# Patient Record
Sex: MALE | Race: WHITE | NOT HISPANIC OR LATINO | Employment: OTHER | URBAN - METROPOLITAN AREA
[De-identification: names, ages, dates, MRNs, and addresses within clinical notes are randomized per-mention and may not be internally consistent; named-entity substitution may affect disease eponyms.]

---

## 2019-08-29 ENCOUNTER — HOSPITAL ENCOUNTER (OUTPATIENT)
Facility: HOSPITAL | Age: 79
Discharge: HOME | End: 2019-08-30
Attending: FAMILY MEDICINE | Admitting: INTERNAL MEDICINE
Payer: MEDICARE

## 2019-08-29 DIAGNOSIS — R55 SYNCOPE: ICD-10-CM

## 2019-08-29 DIAGNOSIS — R00.1 BRADYCARDIA: ICD-10-CM

## 2019-08-29 DIAGNOSIS — R40.20 LOC (LOSS OF CONSCIOUSNESS): Primary | ICD-10-CM

## 2019-08-29 LAB
ALBUMIN SERPL BCP-MCNC: 3.7 G/DL (ref 3.5–5.2)
ALP SERPL-CCNC: 45 U/L (ref 55–135)
ALT SERPL W/O P-5'-P-CCNC: 16 U/L (ref 10–44)
AMPHET+METHAMPHET UR QL: NEGATIVE
ANION GAP SERPL CALC-SCNC: 14 MMOL/L (ref 8–16)
APAP SERPL-MCNC: <3 UG/ML (ref 10–20)
APTT BLDCRRT: 22.5 SEC (ref 21–32)
AST SERPL-CCNC: 14 U/L (ref 10–40)
BACTERIA #/AREA URNS HPF: ABNORMAL /HPF
BARBITURATES UR QL SCN>200 NG/ML: NEGATIVE
BASOPHILS # BLD AUTO: 0.02 K/UL (ref 0–0.2)
BASOPHILS NFR BLD: 0.2 % (ref 0–1.9)
BENZODIAZ UR QL SCN>200 NG/ML: NEGATIVE
BILIRUB SERPL-MCNC: 0.7 MG/DL (ref 0.1–1)
BILIRUB UR QL STRIP: NEGATIVE
BNP SERPL-MCNC: 14 PG/ML (ref 0–99)
BUN SERPL-MCNC: 33 MG/DL (ref 8–23)
BZE UR QL SCN: NEGATIVE
CALCIUM SERPL-MCNC: 8.8 MG/DL (ref 8.7–10.5)
CANNABINOIDS UR QL SCN: NEGATIVE
CHLORIDE SERPL-SCNC: 96 MMOL/L (ref 95–110)
CK SERPL-CCNC: 223 U/L (ref 20–200)
CLARITY UR: CLEAR
CO2 SERPL-SCNC: 32 MMOL/L (ref 23–29)
COLOR UR: YELLOW
CREAT SERPL-MCNC: 1.1 MG/DL (ref 0.5–1.4)
CREAT UR-MCNC: 104.3 MG/DL (ref 23–375)
DIFFERENTIAL METHOD: ABNORMAL
EOSINOPHIL # BLD AUTO: 0.1 K/UL (ref 0–0.5)
EOSINOPHIL NFR BLD: 0.6 % (ref 0–8)
ERYTHROCYTE [DISTWIDTH] IN BLOOD BY AUTOMATED COUNT: 13.3 % (ref 11.5–14.5)
EST. GFR  (AFRICAN AMERICAN): >60 ML/MIN/1.73 M^2
EST. GFR  (NON AFRICAN AMERICAN): >60 ML/MIN/1.73 M^2
GLUCOSE SERPL-MCNC: 142 MG/DL (ref 70–110)
GLUCOSE UR QL STRIP: NEGATIVE
HCT VFR BLD AUTO: 35.6 % (ref 40–54)
HGB BLD-MCNC: 11.8 G/DL (ref 14–18)
HGB UR QL STRIP: NEGATIVE
INR PPP: 1 (ref 0.8–1.2)
KETONES UR QL STRIP: NEGATIVE
LEUKOCYTE ESTERASE UR QL STRIP: ABNORMAL
LYMPHOCYTES # BLD AUTO: 1 K/UL (ref 1–4.8)
LYMPHOCYTES NFR BLD: 9.8 % (ref 18–48)
MAGNESIUM SERPL-MCNC: 1.7 MG/DL (ref 1.6–2.6)
MCH RBC QN AUTO: 30.6 PG (ref 27–31)
MCHC RBC AUTO-ENTMCNC: 33.1 G/DL (ref 32–36)
MCV RBC AUTO: 93 FL (ref 82–98)
METHADONE UR QL SCN>300 NG/ML: NEGATIVE
MICROSCOPIC COMMENT: ABNORMAL
MONOCYTES # BLD AUTO: 0.6 K/UL (ref 0.3–1)
MONOCYTES NFR BLD: 5.5 % (ref 4–15)
NEUTROPHILS # BLD AUTO: 8.5 K/UL (ref 1.8–7.7)
NEUTROPHILS NFR BLD: 84 % (ref 38–73)
NITRITE UR QL STRIP: NEGATIVE
OPIATES UR QL SCN: NEGATIVE
PCP UR QL SCN>25 NG/ML: NEGATIVE
PH UR STRIP: 7 [PH] (ref 5–8)
PLATELET # BLD AUTO: 231 K/UL (ref 150–350)
PMV BLD AUTO: 10.4 FL (ref 9.2–12.9)
POTASSIUM SERPL-SCNC: 2.6 MMOL/L (ref 3.5–5.1)
PROT SERPL-MCNC: 6.1 G/DL (ref 6–8.4)
PROT UR QL STRIP: NEGATIVE
PROTHROMBIN TIME: 11.1 SEC (ref 9–12.5)
RBC # BLD AUTO: 3.85 M/UL (ref 4.6–6.2)
SALICYLATES SERPL-MCNC: <5 MG/DL (ref 15–30)
SODIUM SERPL-SCNC: 142 MMOL/L (ref 136–145)
SP GR UR STRIP: 1.01 (ref 1–1.03)
TOXICOLOGY INFORMATION: NORMAL
TROPONIN I SERPL DL<=0.01 NG/ML-MCNC: 0.03 NG/ML (ref 0–0.03)
TSH SERPL DL<=0.005 MIU/L-ACNC: 3.01 UIU/ML (ref 0.4–4)
URN SPEC COLLECT METH UR: ABNORMAL
UROBILINOGEN UR STRIP-ACNC: NEGATIVE EU/DL
WBC # BLD AUTO: 10.16 K/UL (ref 3.9–12.7)
WBC #/AREA URNS HPF: 25 /HPF (ref 0–5)

## 2019-08-29 PROCEDURE — 85025 COMPLETE CBC W/AUTO DIFF WBC: CPT

## 2019-08-29 PROCEDURE — 87077 CULTURE AEROBIC IDENTIFY: CPT

## 2019-08-29 PROCEDURE — 93005 ELECTROCARDIOGRAM TRACING: CPT

## 2019-08-29 PROCEDURE — 96365 THER/PROPH/DIAG IV INF INIT: CPT

## 2019-08-29 PROCEDURE — 84484 ASSAY OF TROPONIN QUANT: CPT

## 2019-08-29 PROCEDURE — 81000 URINALYSIS NONAUTO W/SCOPE: CPT | Mod: 59

## 2019-08-29 PROCEDURE — 83880 ASSAY OF NATRIURETIC PEPTIDE: CPT

## 2019-08-29 PROCEDURE — 80053 COMPREHEN METABOLIC PANEL: CPT

## 2019-08-29 PROCEDURE — 87086 URINE CULTURE/COLONY COUNT: CPT

## 2019-08-29 PROCEDURE — 87088 URINE BACTERIA CULTURE: CPT

## 2019-08-29 PROCEDURE — 63600175 PHARM REV CODE 636 W HCPCS: Performed by: NURSE PRACTITIONER

## 2019-08-29 PROCEDURE — 80307 DRUG TEST PRSMV CHEM ANLYZR: CPT

## 2019-08-29 PROCEDURE — 25000003 PHARM REV CODE 250: Performed by: FAMILY MEDICINE

## 2019-08-29 PROCEDURE — 99291 CRITICAL CARE FIRST HOUR: CPT | Mod: 25

## 2019-08-29 PROCEDURE — 84443 ASSAY THYROID STIM HORMONE: CPT

## 2019-08-29 PROCEDURE — 93010 ELECTROCARDIOGRAM REPORT: CPT | Mod: ,,, | Performed by: INTERNAL MEDICINE

## 2019-08-29 PROCEDURE — 82550 ASSAY OF CK (CPK): CPT

## 2019-08-29 PROCEDURE — 93010 EKG 12-LEAD: ICD-10-PCS | Mod: ,,, | Performed by: INTERNAL MEDICINE

## 2019-08-29 PROCEDURE — 87186 SC STD MICRODIL/AGAR DIL: CPT

## 2019-08-29 PROCEDURE — 85730 THROMBOPLASTIN TIME PARTIAL: CPT

## 2019-08-29 PROCEDURE — G0378 HOSPITAL OBSERVATION PER HR: HCPCS

## 2019-08-29 PROCEDURE — 85610 PROTHROMBIN TIME: CPT

## 2019-08-29 PROCEDURE — 83735 ASSAY OF MAGNESIUM: CPT

## 2019-08-29 PROCEDURE — 80329 ANALGESICS NON-OPIOID 1 OR 2: CPT

## 2019-08-29 RX ORDER — MAGNESIUM SULFATE HEPTAHYDRATE 40 MG/ML
2 INJECTION, SOLUTION INTRAVENOUS ONCE
Status: COMPLETED | OUTPATIENT
Start: 2019-08-30 | End: 2019-08-30

## 2019-08-29 RX ORDER — LEVOTHYROXINE SODIUM 25 UG/1
25 TABLET ORAL DAILY
Status: ON HOLD | COMMUNITY
End: 2019-08-30 | Stop reason: CLARIF

## 2019-08-29 RX ORDER — METOPROLOL SUCCINATE 25 MG/1
25 TABLET, EXTENDED RELEASE ORAL DAILY
Status: ON HOLD | COMMUNITY
End: 2019-08-30 | Stop reason: CLARIF

## 2019-08-29 RX ORDER — LISINOPRIL AND HYDROCHLOROTHIAZIDE 20; 25 MG/1; MG/1
1 TABLET ORAL 2 TIMES DAILY
COMMUNITY
End: 2020-01-16 | Stop reason: SDUPTHER

## 2019-08-29 RX ORDER — POTASSIUM CHLORIDE 20 MEQ/1
40 TABLET, EXTENDED RELEASE ORAL ONCE
Status: COMPLETED | OUTPATIENT
Start: 2019-08-30 | End: 2019-08-30

## 2019-08-29 RX ORDER — SIMVASTATIN 20 MG/1
20 TABLET, FILM COATED ORAL NIGHTLY
COMMUNITY
End: 2020-01-16 | Stop reason: SDUPTHER

## 2019-08-29 RX ORDER — POTASSIUM CHLORIDE 7.45 MG/ML
10 INJECTION INTRAVENOUS ONCE
Status: COMPLETED | OUTPATIENT
Start: 2019-08-29 | End: 2019-08-30

## 2019-08-29 RX ORDER — POTASSIUM CHLORIDE 20 MEQ/1
40 TABLET, EXTENDED RELEASE ORAL ONCE
Status: COMPLETED | OUTPATIENT
Start: 2019-08-29 | End: 2019-08-29

## 2019-08-29 RX ORDER — ASPIRIN 81 MG/1
81 TABLET ORAL DAILY
COMMUNITY

## 2019-08-29 RX ORDER — TORSEMIDE 10 MG/1
20 TABLET ORAL DAILY
Status: ON HOLD | COMMUNITY
End: 2019-08-30 | Stop reason: SDUPTHER

## 2019-08-29 RX ADMIN — POTASSIUM CHLORIDE 10 MEQ: 10 INJECTION, SOLUTION INTRAVENOUS at 11:08

## 2019-08-29 RX ADMIN — POTASSIUM CHLORIDE 40 MEQ: 1500 TABLET, EXTENDED RELEASE ORAL at 10:08

## 2019-08-30 VITALS
OXYGEN SATURATION: 97 % | WEIGHT: 198.44 LBS | DIASTOLIC BLOOD PRESSURE: 74 MMHG | BODY MASS INDEX: 26.88 KG/M2 | HEIGHT: 72 IN | TEMPERATURE: 97 F | HEART RATE: 59 BPM | SYSTOLIC BLOOD PRESSURE: 156 MMHG | RESPIRATION RATE: 18 BRPM

## 2019-08-30 PROBLEM — N39.0 UTI (URINARY TRACT INFECTION): Status: ACTIVE | Noted: 2019-08-30

## 2019-08-30 PROBLEM — E11.8 DIABETES MELLITUS WITH COMPLICATION: Status: ACTIVE | Noted: 2017-03-08

## 2019-08-30 PROBLEM — I95.9 HYPOTENSION: Status: RESOLVED | Noted: 2019-08-30 | Resolved: 2019-08-30

## 2019-08-30 PROBLEM — R79.89 ELEVATED TROPONIN: Status: RESOLVED | Noted: 2019-08-30 | Resolved: 2019-08-30

## 2019-08-30 PROBLEM — R79.89 ELEVATED TROPONIN: Status: ACTIVE | Noted: 2019-08-30

## 2019-08-30 PROBLEM — R55 SYNCOPE: Status: RESOLVED | Noted: 2019-08-29 | Resolved: 2019-08-30

## 2019-08-30 PROBLEM — I69.30 HISTORY OF CVA WITH RESIDUAL DEFICIT: Chronic | Status: ACTIVE | Noted: 2019-08-30

## 2019-08-30 PROBLEM — I95.9 HYPOTENSION: Status: ACTIVE | Noted: 2019-08-30

## 2019-08-30 PROBLEM — R00.1 SINUS BRADYCARDIA: Status: ACTIVE | Noted: 2019-08-30

## 2019-08-30 PROBLEM — E11.8 DIABETES MELLITUS WITH COMPLICATION: Chronic | Status: ACTIVE | Noted: 2017-03-08

## 2019-08-30 PROBLEM — R55 SYNCOPE: Status: ACTIVE | Noted: 2019-08-29

## 2019-08-30 PROBLEM — E87.6 HYPOKALEMIA: Status: ACTIVE | Noted: 2019-08-30

## 2019-08-30 PROBLEM — E11.9 TYPE 2 DIABETES MELLITUS, WITHOUT LONG-TERM CURRENT USE OF INSULIN: Chronic | Status: ACTIVE | Noted: 2017-03-08

## 2019-08-30 LAB
ANION GAP SERPL CALC-SCNC: 14 MMOL/L (ref 8–16)
BASOPHILS # BLD AUTO: 0.03 K/UL (ref 0–0.2)
BASOPHILS NFR BLD: 0.4 % (ref 0–1.9)
BUN SERPL-MCNC: 28 MG/DL (ref 8–23)
CALCIUM SERPL-MCNC: 8.7 MG/DL (ref 8.7–10.5)
CHLORIDE SERPL-SCNC: 97 MMOL/L (ref 95–110)
CHOLEST SERPL-MCNC: 141 MG/DL (ref 120–199)
CHOLEST/HDLC SERPL: 2.5 {RATIO} (ref 2–5)
CO2 SERPL-SCNC: 31 MMOL/L (ref 23–29)
CREAT SERPL-MCNC: 1.1 MG/DL (ref 0.5–1.4)
DIASTOLIC DYSFUNCTION: NO
DIFFERENTIAL METHOD: ABNORMAL
EOSINOPHIL # BLD AUTO: 0.1 K/UL (ref 0–0.5)
EOSINOPHIL NFR BLD: 0.7 % (ref 0–8)
ERYTHROCYTE [DISTWIDTH] IN BLOOD BY AUTOMATED COUNT: 13.2 % (ref 11.5–14.5)
EST. GFR  (AFRICAN AMERICAN): >60 ML/MIN/1.73 M^2
EST. GFR  (NON AFRICAN AMERICAN): >60 ML/MIN/1.73 M^2
ESTIMATED AVG GLUCOSE: 148 MG/DL (ref 68–131)
ESTIMATED PA SYSTOLIC PRESSURE: 34.42
GLUCOSE SERPL-MCNC: 173 MG/DL (ref 70–110)
HBA1C MFR BLD HPLC: 6.8 % (ref 4–5.6)
HCT VFR BLD AUTO: 35.2 % (ref 40–54)
HDLC SERPL-MCNC: 56 MG/DL (ref 40–75)
HDLC SERPL: 39.7 % (ref 20–50)
HGB BLD-MCNC: 11.9 G/DL (ref 14–18)
LACTATE SERPL-SCNC: 1.3 MMOL/L (ref 0.5–2.2)
LACTATE SERPL-SCNC: 3 MMOL/L (ref 0.5–2.2)
LDLC SERPL CALC-MCNC: 74.4 MG/DL (ref 63–159)
LYMPHOCYTES # BLD AUTO: 1.3 K/UL (ref 1–4.8)
LYMPHOCYTES NFR BLD: 18.6 % (ref 18–48)
MAGNESIUM SERPL-MCNC: 2 MG/DL (ref 1.6–2.6)
MCH RBC QN AUTO: 31.5 PG (ref 27–31)
MCHC RBC AUTO-ENTMCNC: 33.8 G/DL (ref 32–36)
MCV RBC AUTO: 93 FL (ref 82–98)
MONOCYTES # BLD AUTO: 0.4 K/UL (ref 0.3–1)
MONOCYTES NFR BLD: 6.4 % (ref 4–15)
NEUTROPHILS # BLD AUTO: 5 K/UL (ref 1.8–7.7)
NEUTROPHILS NFR BLD: 74 % (ref 38–73)
NONHDLC SERPL-MCNC: 85 MG/DL
PLATELET # BLD AUTO: 237 K/UL (ref 150–350)
PMV BLD AUTO: 10.4 FL (ref 9.2–12.9)
POCT GLUCOSE: 135 MG/DL (ref 70–110)
POTASSIUM SERPL-SCNC: 2.8 MMOL/L (ref 3.5–5.1)
POTASSIUM SERPL-SCNC: 3.2 MMOL/L (ref 3.5–5.1)
RBC # BLD AUTO: 3.78 M/UL (ref 4.6–6.2)
RETIRED EF AND QEF - SEE NOTES: 60 (ref 55–65)
SODIUM SERPL-SCNC: 142 MMOL/L (ref 136–145)
TRIGL SERPL-MCNC: 53 MG/DL (ref 30–150)
TROPONIN I SERPL DL<=0.01 NG/ML-MCNC: 0.02 NG/ML (ref 0–0.03)
TROPONIN I SERPL DL<=0.01 NG/ML-MCNC: 0.02 NG/ML (ref 0–0.03)
WBC # BLD AUTO: 6.71 K/UL (ref 3.9–12.7)

## 2019-08-30 PROCEDURE — 94761 N-INVAS EAR/PLS OXIMETRY MLT: CPT

## 2019-08-30 PROCEDURE — 97161 PT EVAL LOW COMPLEX 20 MIN: CPT

## 2019-08-30 PROCEDURE — 63600175 PHARM REV CODE 636 W HCPCS: Performed by: NURSE PRACTITIONER

## 2019-08-30 PROCEDURE — 83735 ASSAY OF MAGNESIUM: CPT

## 2019-08-30 PROCEDURE — 96375 TX/PRO/DX INJ NEW DRUG ADDON: CPT | Mod: 59

## 2019-08-30 PROCEDURE — 97166 OT EVAL MOD COMPLEX 45 MIN: CPT

## 2019-08-30 PROCEDURE — 25000003 PHARM REV CODE 250: Performed by: NURSE PRACTITIONER

## 2019-08-30 PROCEDURE — 93306 2D ECHO WITH COLOR FLOW DOPPLER: ICD-10-PCS | Mod: 26,,, | Performed by: INTERNAL MEDICINE

## 2019-08-30 PROCEDURE — 97116 GAIT TRAINING THERAPY: CPT

## 2019-08-30 PROCEDURE — 63600175 PHARM REV CODE 636 W HCPCS: Performed by: PHYSICIAN ASSISTANT

## 2019-08-30 PROCEDURE — 93306 TTE W/DOPPLER COMPLETE: CPT

## 2019-08-30 PROCEDURE — 84132 ASSAY OF SERUM POTASSIUM: CPT | Mod: 91

## 2019-08-30 PROCEDURE — 36415 COLL VENOUS BLD VENIPUNCTURE: CPT

## 2019-08-30 PROCEDURE — 25000003 PHARM REV CODE 250: Performed by: INTERNAL MEDICINE

## 2019-08-30 PROCEDURE — 80061 LIPID PANEL: CPT

## 2019-08-30 PROCEDURE — 84484 ASSAY OF TROPONIN QUANT: CPT

## 2019-08-30 PROCEDURE — 83036 HEMOGLOBIN GLYCOSYLATED A1C: CPT

## 2019-08-30 PROCEDURE — 85025 COMPLETE CBC W/AUTO DIFF WBC: CPT

## 2019-08-30 PROCEDURE — G0378 HOSPITAL OBSERVATION PER HR: HCPCS

## 2019-08-30 PROCEDURE — 80048 BASIC METABOLIC PNL TOTAL CA: CPT

## 2019-08-30 PROCEDURE — 63600175 PHARM REV CODE 636 W HCPCS: Performed by: INTERNAL MEDICINE

## 2019-08-30 PROCEDURE — 25000003 PHARM REV CODE 250: Performed by: PHYSICIAN ASSISTANT

## 2019-08-30 PROCEDURE — 83605 ASSAY OF LACTIC ACID: CPT

## 2019-08-30 PROCEDURE — 93306 TTE W/DOPPLER COMPLETE: CPT | Mod: 26,,, | Performed by: INTERNAL MEDICINE

## 2019-08-30 PROCEDURE — 96376 TX/PRO/DX INJ SAME DRUG ADON: CPT | Mod: 59

## 2019-08-30 PROCEDURE — 97535 SELF CARE MNGMENT TRAINING: CPT

## 2019-08-30 RX ORDER — LEVOTHYROXINE SODIUM 75 UG/1
75 TABLET ORAL
COMMUNITY
Start: 2019-07-16 | End: 2019-10-25 | Stop reason: SDUPTHER

## 2019-08-30 RX ORDER — ENOXAPARIN SODIUM 100 MG/ML
40 INJECTION SUBCUTANEOUS EVERY 24 HOURS
Status: DISCONTINUED | OUTPATIENT
Start: 2019-08-30 | End: 2019-08-30 | Stop reason: HOSPADM

## 2019-08-30 RX ORDER — METFORMIN HYDROCHLORIDE 500 MG/1
500 TABLET ORAL 2 TIMES DAILY WITH MEALS
COMMUNITY
Start: 2019-07-16 | End: 2019-10-21 | Stop reason: SDUPTHER

## 2019-08-30 RX ORDER — IBUPROFEN 200 MG
16 TABLET ORAL
Status: DISCONTINUED | OUTPATIENT
Start: 2019-08-30 | End: 2019-08-30 | Stop reason: HOSPADM

## 2019-08-30 RX ORDER — HYDRALAZINE HYDROCHLORIDE 20 MG/ML
10 INJECTION INTRAMUSCULAR; INTRAVENOUS EVERY 6 HOURS PRN
Status: DISCONTINUED | OUTPATIENT
Start: 2019-08-30 | End: 2019-08-30 | Stop reason: HOSPADM

## 2019-08-30 RX ORDER — SIMVASTATIN 20 MG/1
20 TABLET, FILM COATED ORAL NIGHTLY
Status: DISCONTINUED | OUTPATIENT
Start: 2019-08-30 | End: 2019-08-30

## 2019-08-30 RX ORDER — TORSEMIDE 10 MG/1
20 TABLET ORAL DAILY
Qty: 30 TABLET | Refills: 0 | Status: SHIPPED | OUTPATIENT
Start: 2019-09-02 | End: 2019-10-18 | Stop reason: SDUPTHER

## 2019-08-30 RX ORDER — SODIUM CHLORIDE 9 MG/ML
INJECTION, SOLUTION INTRAVENOUS CONTINUOUS
Status: DISCONTINUED | OUTPATIENT
Start: 2019-08-30 | End: 2019-08-30 | Stop reason: HOSPADM

## 2019-08-30 RX ORDER — LEVOTHYROXINE SODIUM 75 UG/1
75 TABLET ORAL
Status: DISCONTINUED | OUTPATIENT
Start: 2019-08-30 | End: 2019-08-30 | Stop reason: HOSPADM

## 2019-08-30 RX ORDER — INSULIN ASPART 100 [IU]/ML
0-5 INJECTION, SOLUTION INTRAVENOUS; SUBCUTANEOUS
Status: DISCONTINUED | OUTPATIENT
Start: 2019-08-30 | End: 2019-08-30 | Stop reason: HOSPADM

## 2019-08-30 RX ORDER — TAMSULOSIN HYDROCHLORIDE 0.4 MG/1
0.4 CAPSULE ORAL 2 TIMES DAILY
COMMUNITY
Start: 2018-01-10 | End: 2020-06-17

## 2019-08-30 RX ORDER — POTASSIUM CHLORIDE 20 MEQ/1
20 TABLET, EXTENDED RELEASE ORAL DAILY
Qty: 30 TABLET | Refills: 1 | Status: SHIPPED | OUTPATIENT
Start: 2019-08-30 | End: 2019-10-18 | Stop reason: SDUPTHER

## 2019-08-30 RX ORDER — METOPROLOL TARTRATE 25 MG/1
25 TABLET, FILM COATED ORAL 2 TIMES DAILY
COMMUNITY
Start: 2019-07-16 | End: 2019-10-30 | Stop reason: SDUPTHER

## 2019-08-30 RX ORDER — ONDANSETRON 2 MG/ML
4 INJECTION INTRAMUSCULAR; INTRAVENOUS EVERY 6 HOURS PRN
Status: DISCONTINUED | OUTPATIENT
Start: 2019-08-30 | End: 2019-08-30 | Stop reason: HOSPADM

## 2019-08-30 RX ORDER — ASPIRIN 81 MG/1
81 TABLET ORAL DAILY
Status: DISCONTINUED | OUTPATIENT
Start: 2019-08-30 | End: 2019-08-30 | Stop reason: HOSPADM

## 2019-08-30 RX ORDER — POTASSIUM CHLORIDE 7.45 MG/ML
10 INJECTION INTRAVENOUS
Status: COMPLETED | OUTPATIENT
Start: 2019-08-30 | End: 2019-08-30

## 2019-08-30 RX ORDER — GLUCAGON 1 MG
1 KIT INJECTION
Status: DISCONTINUED | OUTPATIENT
Start: 2019-08-30 | End: 2019-08-30 | Stop reason: HOSPADM

## 2019-08-30 RX ORDER — LEVOFLOXACIN 750 MG/1
750 TABLET ORAL DAILY
Qty: 5 TABLET | Refills: 0 | Status: SHIPPED | OUTPATIENT
Start: 2019-08-30 | End: 2019-09-04

## 2019-08-30 RX ORDER — MECLIZINE HYDROCHLORIDE 25 MG/1
25 TABLET ORAL 3 TIMES DAILY PRN
Status: DISCONTINUED | OUTPATIENT
Start: 2019-08-30 | End: 2019-08-30 | Stop reason: HOSPADM

## 2019-08-30 RX ORDER — IBUPROFEN 200 MG
24 TABLET ORAL
Status: DISCONTINUED | OUTPATIENT
Start: 2019-08-30 | End: 2019-08-30 | Stop reason: HOSPADM

## 2019-08-30 RX ORDER — SIMVASTATIN 20 MG/1
20 TABLET, FILM COATED ORAL DAILY
Status: DISCONTINUED | OUTPATIENT
Start: 2019-08-30 | End: 2019-08-30 | Stop reason: HOSPADM

## 2019-08-30 RX ORDER — LISINOPRIL AND HYDROCHLOROTHIAZIDE 20; 25 MG/1; MG/1
1 TABLET ORAL DAILY
Status: DISCONTINUED | OUTPATIENT
Start: 2019-08-30 | End: 2019-08-30

## 2019-08-30 RX ORDER — SODIUM CHLORIDE 0.9 % (FLUSH) 0.9 %
10 SYRINGE (ML) INJECTION
Status: DISCONTINUED | OUTPATIENT
Start: 2019-08-30 | End: 2019-08-30 | Stop reason: HOSPADM

## 2019-08-30 RX ORDER — HYDROCHLOROTHIAZIDE 25 MG/1
25 TABLET ORAL DAILY
Status: DISCONTINUED | OUTPATIENT
Start: 2019-08-30 | End: 2019-08-30 | Stop reason: HOSPADM

## 2019-08-30 RX ORDER — ACETAMINOPHEN 325 MG/1
650 TABLET ORAL EVERY 6 HOURS PRN
Status: DISCONTINUED | OUTPATIENT
Start: 2019-08-30 | End: 2019-08-30 | Stop reason: HOSPADM

## 2019-08-30 RX ORDER — PANTOPRAZOLE SODIUM 40 MG/1
40 TABLET, DELAYED RELEASE ORAL DAILY
Status: DISCONTINUED | OUTPATIENT
Start: 2019-08-30 | End: 2019-08-30 | Stop reason: HOSPADM

## 2019-08-30 RX ORDER — POTASSIUM CHLORIDE 20 MEQ/15ML
20 SOLUTION ORAL EVERY 6 HOURS
Status: DISCONTINUED | OUTPATIENT
Start: 2019-08-30 | End: 2019-08-30 | Stop reason: HOSPADM

## 2019-08-30 RX ORDER — TAMSULOSIN HYDROCHLORIDE 0.4 MG/1
0.4 CAPSULE ORAL DAILY
Status: DISCONTINUED | OUTPATIENT
Start: 2019-08-30 | End: 2019-08-30 | Stop reason: HOSPADM

## 2019-08-30 RX ORDER — METOPROLOL TARTRATE 25 MG/1
25 TABLET, FILM COATED ORAL 2 TIMES DAILY
Status: DISCONTINUED | OUTPATIENT
Start: 2019-08-30 | End: 2019-08-30 | Stop reason: HOSPADM

## 2019-08-30 RX ORDER — LISINOPRIL 20 MG/1
20 TABLET ORAL DAILY
Status: DISCONTINUED | OUTPATIENT
Start: 2019-08-30 | End: 2019-08-30 | Stop reason: HOSPADM

## 2019-08-30 RX ADMIN — ASPIRIN 81 MG: 81 TABLET, COATED ORAL at 08:08

## 2019-08-30 RX ADMIN — SODIUM CHLORIDE: 0.9 INJECTION, SOLUTION INTRAVENOUS at 07:08

## 2019-08-30 RX ADMIN — POTASSIUM CHLORIDE 10 MEQ: 10 INJECTION, SOLUTION INTRAVENOUS at 11:08

## 2019-08-30 RX ADMIN — CEFTRIAXONE 1 G: 1 INJECTION, SOLUTION INTRAVENOUS at 05:08

## 2019-08-30 RX ADMIN — POTASSIUM CHLORIDE 10 MEQ: 10 INJECTION, SOLUTION INTRAVENOUS at 10:08

## 2019-08-30 RX ADMIN — SODIUM CHLORIDE 1000 ML: 0.9 INJECTION, SOLUTION INTRAVENOUS at 05:08

## 2019-08-30 RX ADMIN — POTASSIUM CHLORIDE 20 MEQ: 20 SOLUTION ORAL at 11:08

## 2019-08-30 RX ADMIN — HYDROCHLOROTHIAZIDE 25 MG: 25 TABLET ORAL at 11:08

## 2019-08-30 RX ADMIN — MAGNESIUM SULFATE IN WATER 2 G: 40 INJECTION, SOLUTION INTRAVENOUS at 01:08

## 2019-08-30 RX ADMIN — SIMVASTATIN 20 MG: 20 TABLET, FILM COATED ORAL at 11:08

## 2019-08-30 RX ADMIN — POTASSIUM CHLORIDE 40 MEQ: 1500 TABLET, EXTENDED RELEASE ORAL at 01:08

## 2019-08-30 RX ADMIN — LISINOPRIL 20 MG: 20 TABLET ORAL at 11:08

## 2019-08-30 RX ADMIN — PANTOPRAZOLE SODIUM 40 MG: 40 TABLET, DELAYED RELEASE ORAL at 08:08

## 2019-08-30 RX ADMIN — TAMSULOSIN HYDROCHLORIDE 0.4 MG: 0.4 CAPSULE ORAL at 08:08

## 2019-08-30 RX ADMIN — LEVOTHYROXINE SODIUM 75 MCG: 75 TABLET ORAL at 05:08

## 2019-08-30 RX ADMIN — HYDRALAZINE HYDROCHLORIDE 10 MG: 20 INJECTION INTRAMUSCULAR; INTRAVENOUS at 03:08

## 2019-08-30 NOTE — ASSESSMENT & PLAN NOTE
- Troponin minimally elevated at 0.03, likely due to hypotension.  No ischemic abnormalities on EKG.  No chest pain or anginal equivalent.  - Trend serial cardiac enzymes.  - Continue home ASA and statin.  Beta blocker on hold as above.  - Further recs pending clinical course.

## 2019-08-30 NOTE — ASSESSMENT & PLAN NOTE
- Resolved.  Likely due to IVVD.  - Hold home lisinopril-HCTZ, Toprol XL and torsemide.  - Continue IV hydration.

## 2019-08-30 NOTE — CHAPLAIN
Initial visit with patient and family.  Provided ministries of listening and presence.  Took time to assess for any other spiritual needs and pt currently didn't have any.  Spiritual care remains available as needed.    Chaplain Kareem Barclay M.Div., B[facility]

## 2019-08-30 NOTE — PLAN OF CARE
Problem: Adult Inpatient Plan of Care  Goal: Patient-Specific Goal (Individualization)  Outcome: Ongoing (interventions implemented as appropriate)  Pt AAO x4.  VSS  Pt able to make needs known.  Pt remained afebrile throughout this shift.   Pt remained free of falls this shift.   Pt denies pain this shift.  Plan of care reviewed. Patient verbalizes understanding.   Pt moving/turing independent. Frequent weight shifting encouraged.  Patient sinus rhythm on monitor.   Bed low, side rails up x 2, wheels locked, call light in reach.   Hourly rounding completed.   Will continue to monitor.

## 2019-08-30 NOTE — PLAN OF CARE
Problem: Occupational Therapy Goal  Goal: Occupational Therapy Goal  1)  PT WILL PERFORM X15 REPS OF BUE AROM WITH FOCUS ON INCREASED ENDURANCE.  2)  PT WILL PERFORM NECK AROM ALL PLANES WITH GOAL OF DECREASED NECK FLEXION.  3)  PT WILL PERFORM LB DRESSING WITH AE PRN WITH MIN (A).  4)  PT WILL T/F EOB <> BSC WITH MIN (A).  PT WOULD BENEFIT FROM SKILLED OT INTERVENTION TO INCREASE SAFETY AND (I) WITH ALL LEVELS OF SELF CARE.

## 2019-08-30 NOTE — ED PROVIDER NOTES
"SCRIBE #1 NOTE: I, Khoa Bipin, am scribing for, and in the presence of, Samantha Fontenot MD. I have scribed the entire note.       History     Chief Complaint   Patient presents with    Loss of Consciousness     at [facility], didn't eat today BP on scene 82/56     Review of patient's allergies indicates:   Allergen Reactions    Sulfa (sulfonamide antibiotics) Rash         History of Present Illness     HPI    8/29/2019, 8:26 PM  History obtained from the patient      History of Present Illness: Scot Penaloza is a 78 y.o. male patient with a PMHx of DM2 and stroke who presents to the Emergency Department for evaluation of a syncopal episode which onset suddenly 1 hour ago while shopping at [employer]. Pt's family states that the pt was unconscious for 5 minutes. Symptoms are episodic and moderate in severity. No mitigating or exacerbating factors reported. Associated sxs include diaphoresis, light-headedness, and pallor. Patient denies any head injury, HA, dizziness, back pain, neck pain, knee pain, hip pain, abd pain, CP, SOB, and all other sxs at this time. No prior Tx reported. Pt admits to eating "something sugary" earlier today prior to onset of sxs. No further complaints or concerns at this time.         Arrival mode: Personal vehicle    PCP: Michael Zhao MD        Past Medical History:  Past Medical History:   Diagnosis Date    Cancer     prostate    Diabetes mellitus     Encounter for blood transfusion     [place] (hard of hearing)     Hypertension     Stroke 2019    Thyroid disease        Past Surgical History:  Past Surgical History:   Procedure Laterality Date    CATARACT EXTRACTION           Family History:  History reviewed. No pertinent family history.       Social History:  Social History     Tobacco Use    Smoking status: Never Smoker    Smokeless tobacco: Never Used   Substance and Sexual Activity    Alcohol use: Not Currently    Drug use: Never    Sexual activity: Not Currently "        Review of Systems     Review of Systems   Constitutional: Positive for diaphoresis. Negative for fever.   HENT: Negative for sore throat.         - head injury   Respiratory: Negative for shortness of breath.    Cardiovascular: Negative for chest pain.   Gastrointestinal: Negative for abdominal pain and nausea.   Genitourinary: Negative for dysuria.   Musculoskeletal: Negative for arthralgias (knee, hip), back pain and neck pain.   Skin: Positive for pallor. Negative for rash.   Neurological: Positive for syncope and light-headedness. Negative for dizziness, weakness and headaches.   Hematological: Does not bruise/bleed easily.   All other systems reviewed and are negative.       Physical Exam     Initial Vitals [08/29/19 1926]   BP Pulse Resp Temp SpO2   (!) 126/56 (!) 50 18 98 °F (36.7 °C) 96 %      MAP       --          Physical Exam  Nursing Notes and Vital Signs Reviewed.  Constitutional: Patient is in no acute distress. Well-developed and well-nourished.  Head: Atraumatic. Normocephalic.  Eyes: PERRL. EOM intact. Conjunctivae are not pale. No scleral icterus.  ENT: Mucous membranes are moist. Oropharynx is clear and symmetric.    Neck: Supple. Full ROM. No lymphadenopathy.  Cardiovascular: Bradycardic. Regular rhythm. No murmurs, rubs, or gallops. Distal pulses are 2+ and symmetric.  Pulmonary/Chest: No respiratory distress. Clear to auscultation bilaterally. No wheezing or rales.  Abdominal: Soft and non-distended.  There is no tenderness.  No rebound, guarding, or rigidity. Good bowel sounds.  Genitourinary: No CVA tenderness  Musculoskeletal: Moves all extremities. No obvious deformities. +1-+2 pitting edema of the extremities. No calf tenderness.  Skin: Warm and dry.  Neurological: Patient is alert and oriented to person, place and time. Pupils ERRL and EOM normal. Cranial nerves II-XII are intact. Strength is 5/5 to the right upper and right lower extremities; strength is 4/5 to the left upper and  2/5 to the left lower extremities. There is no pronator drift of outstretched arms. Light touch sense is intact. Speech is clear and normal.   Psychiatric: Normal affect. Good eye contact. Appropriate in content.     ED Course   Critical Care  Date/Time: 8/29/2019 11:18 PM  Performed by: Samantha Fontenot MD  Authorized by: Samantha Fontenot MD   Direct patient critical care time: 13 minutes  Additional history critical care time: 11 minutes  Ordering / reviewing critical care time: 7 minutes  Documentation critical care time: 8 minutes  Total critical care time (exclusive of procedural time) : 39 minutes  Critical care time was exclusive of separately billable procedures and treating other patients and teaching time.  Critical care was necessary to treat or prevent imminent or life-threatening deterioration of the following conditions: CNS failure or compromise (cardiovascular failure).  Critical care was time spent personally by me on the following activities: blood draw for specimens, development of treatment plan with patient or surrogate, interpretation of cardiac output measurements, evaluation of patient's response to treatment, examination of patient, obtaining history from patient or surrogate, ordering and performing treatments and interventions, ordering and review of laboratory studies, ordering and review of radiographic studies, pulse oximetry, re-evaluation of patient's condition and review of old charts.        ED Vital Signs:  Vitals:    08/29/19 1926 08/29/19 2014 08/29/19 2037 08/29/19 2048   BP: (!) 126/56 (!) 99/54 126/63    Pulse: (!) 50 (!) 52 (!) 50 (!) 58   Resp: 18 18 15 16   Temp: 98 °F (36.7 °C)      TempSrc: Oral      SpO2: 96% 100% 99% 99%   Height: 6' (1.829 m)       08/29/19 2112 08/29/19 2118 08/29/19 2121 08/29/19 2124   BP: 138/67 (!) 120/57 (!) 144/64 (!) 160/71   Pulse: (!) 55 (!) 55 63 64   Resp: 13 16 16 18   Temp:       TempSrc:       SpO2: 97% 98% 98% 98%   Height:         08/29/19 2128 08/29/19 2203 08/29/19 2302   BP: (!) 160/71 138/72 (!) 177/81   Pulse: 61 (!) 58 (!) 57   Resp: (!) 23 20 10   Temp:      TempSrc:      SpO2: 98% 100% 99%   Height:          Abnormal Lab Results:  Labs Reviewed   ACETAMINOPHEN LEVEL - Abnormal; Notable for the following components:       Result Value    Acetaminophen (Tylenol), Serum <3.0 (*)     All other components within normal limits   CBC W/ AUTO DIFFERENTIAL - Abnormal; Notable for the following components:    RBC 3.85 (*)     Hemoglobin 11.8 (*)     Hematocrit 35.6 (*)     Gran # (ANC) 8.5 (*)     Gran% 84.0 (*)     Lymph% 9.8 (*)     All other components within normal limits   CK - Abnormal; Notable for the following components:     (*)     All other components within normal limits   COMPREHENSIVE METABOLIC PANEL - Abnormal; Notable for the following components:    Potassium 2.6 (*)     CO2 32 (*)     Glucose 142 (*)     BUN, Bld 33 (*)     Alkaline Phosphatase 45 (*)     All other components within normal limits    Narrative:       K critical result(s) called and verbal readback obtained from   Erlinda Mcgovern RN, 08/29/2019 21:41   SALICYLATE LEVEL - Abnormal; Notable for the following components:    Salicylate Lvl <5.0 (*)     All other components within normal limits   TROPONIN I - Abnormal; Notable for the following components:    Troponin I 0.030 (*)     All other components within normal limits   URINALYSIS, REFLEX TO URINE CULTURE - Abnormal; Notable for the following components:    Leukocytes, UA 3+ (*)     All other components within normal limits    Narrative:     Preferred Collection Type->Urine, Clean Catch   URINALYSIS MICROSCOPIC - Abnormal; Notable for the following components:    WBC, UA 25 (*)     Bacteria Many (*)     All other components within normal limits    Narrative:     Preferred Collection Type->Urine, Clean Catch   CULTURE, URINE   APTT   B-TYPE NATRIURETIC PEPTIDE   DRUG SCREEN PANEL, URINE EMERGENCY     Narrative:     Preferred Collection Type->Urine, Clean Catch   MAGNESIUM   PROTIME-INR   TSH        All Lab Results:  Results for orders placed or performed during the hospital encounter of 08/29/19   Acetaminophen level   Result Value Ref Range    Acetaminophen (Tylenol), Serum <3.0 (L) 10.0 - 20.0 ug/mL   APTT   Result Value Ref Range    aPTT 22.5 21.0 - 32.0 sec   CBC auto differential   Result Value Ref Range    WBC 10.16 3.90 - 12.70 K/uL    RBC 3.85 (L) 4.60 - 6.20 M/uL    Hemoglobin 11.8 (L) 14.0 - 18.0 g/dL    Hematocrit 35.6 (L) 40.0 - 54.0 %    Mean Corpuscular Volume 93 82 - 98 fL    Mean Corpuscular Hemoglobin 30.6 27.0 - 31.0 pg    Mean Corpuscular Hemoglobin Conc 33.1 32.0 - 36.0 g/dL    RDW 13.3 11.5 - 14.5 %    Platelets 231 150 - 350 K/uL    MPV 10.4 9.2 - 12.9 fL    Gran # (ANC) 8.5 (H) 1.8 - 7.7 K/uL    Lymph # 1.0 1.0 - 4.8 K/uL    Mono # 0.6 0.3 - 1.0 K/uL    Eos # 0.1 0.0 - 0.5 K/uL    Baso # 0.02 0.00 - 0.20 K/uL    Gran% 84.0 (H) 38.0 - 73.0 %    Lymph% 9.8 (L) 18.0 - 48.0 %    Mono% 5.5 4.0 - 15.0 %    Eosinophil% 0.6 0.0 - 8.0 %    Basophil% 0.2 0.0 - 1.9 %    Differential Method Automated    CK   Result Value Ref Range     (H) 20 - 200 U/L   Brain natriuretic peptide   Result Value Ref Range    BNP 14 0 - 99 pg/mL   Comprehensive metabolic panel   Result Value Ref Range    Sodium 142 136 - 145 mmol/L    Potassium 2.6 (LL) 3.5 - 5.1 mmol/L    Chloride 96 95 - 110 mmol/L    CO2 32 (H) 23 - 29 mmol/L    Glucose 142 (H) 70 - 110 mg/dL    BUN, Bld 33 (H) 8 - 23 mg/dL    Creatinine 1.1 0.5 - 1.4 mg/dL    Calcium 8.8 8.7 - 10.5 mg/dL    Total Protein 6.1 6.0 - 8.4 g/dL    Albumin 3.7 3.5 - 5.2 g/dL    Total Bilirubin 0.7 0.1 - 1.0 mg/dL    Alkaline Phosphatase 45 (L) 55 - 135 U/L    AST 14 10 - 40 U/L    ALT 16 10 - 44 U/L    Anion Gap 14 8 - 16 mmol/L    eGFR if African American >60 >60 mL/min/1.73 m^2    eGFR if non African American >60 >60 mL/min/1.73 m^2   Drug screen panel, emergency    Result Value Ref Range    Benzodiazepines Negative     Methadone metabolites Negative     Cocaine (Metab.) Negative     Opiate Scrn, Ur Negative     Barbiturate Screen, Ur Negative     Amphetamine Screen, Ur Negative     THC Negative     Phencyclidine Negative     Creatinine, Random Ur 104.3 23.0 - 375.0 mg/dL    Toxicology Information SEE COMMENT    Magnesium   Result Value Ref Range    Magnesium 1.7 1.6 - 2.6 mg/dL   Protime-INR   Result Value Ref Range    Prothrombin Time 11.1 9.0 - 12.5 sec    INR 1.0 0.8 - 1.2   Salicylate level   Result Value Ref Range    Salicylate Lvl <5.0 (L) 15.0 - 30.0 mg/dL   Troponin I   Result Value Ref Range    Troponin I 0.030 (H) 0.000 - 0.026 ng/mL   TSH   Result Value Ref Range    TSH 3.013 0.400 - 4.000 uIU/mL   Urinalysis, Reflex to Urine Culture Urine, Clean Catch   Result Value Ref Range    Specimen UA Urine, Clean Catch     Color, UA Yellow Yellow, Straw, Thu    Appearance, UA Clear Clear    pH, UA 7.0 5.0 - 8.0    Specific Gravity, UA 1.010 1.005 - 1.030    Protein, UA Negative Negative    Glucose, UA Negative Negative    Ketones, UA Negative Negative    Bilirubin (UA) Negative Negative    Occult Blood UA Negative Negative    Nitrite, UA Negative Negative    Urobilinogen, UA Negative <2.0 EU/dL    Leukocytes, UA 3+ (A) Negative   Urinalysis Microscopic   Result Value Ref Range    WBC, UA 25 (H) 0 - 5 /hpf    Bacteria Many (A) None-Occ /hpf    Microscopic Comment SEE COMMENT          Imaging Results:  Imaging Results          CT Cervical Spine Without Contrast (Final result)  Result time 08/29/19 21:21:37    Final result by ARIA Francis Sr., MD (08/29/19 21:21:37)                 Impression:      1. There is reversal of the normal cervical lordosis.  2. There is a large posterior bridging osteophyte between the C5 and C7 vertebral bodies.  3. There is grade 1 anterolisthesis of C4 on C5.  4. There are areas of relative radiolucency scattered throughout the visualized  skeletal system.  If additional imaging evaluation is clinically indicated, recommend consideration of a nonemergent nuclear medicine bone scan.  All CT scans at this facility use dose modulation, iterative reconstruction, and/or weight base dosing when appropriate to reduce radiation dose when appropriate to reduce radiation dose to as low as reasonably achievable.      Electronically signed by: Michael Francis MD  Date:    08/29/2019  Time:    21:21             Narrative:    EXAMINATION:  CT CERVICAL SPINE WITHOUT CONTRAST    CLINICAL HISTORY:  Loss of consciousness    TECHNIQUE:  Standard cervical spine CT protocol was performed without IV contrast.    COMPARISON:  None    FINDINGS:  There is reversal of the normal cervical lordosis.  There is a large posterior bridging osteophyte between the C5 and C7 vertebral bodies.  There is grade 1 anterolisthesis of C4 on C5.  There is no fracture or scoliosis.  There are areas of relative radiolucency scattered throughout the visualized skeletal system.  The soft tissue of the neck is normal in appearance.                               CT Head Without Contrast (Final result)  Result time 08/29/19 21:16:46    Final result by ARIA Francis Sr., MD (08/29/19 21:16:46)                 Impression:      1. There are chronic appearing ischemic changes lateral to the anterior aspect of the body of the left lateral ventricle. There is no evidence of an acute ischemic event.  2. There is no intracranial hemorrhage.  All CT scans at this facility use dose modulation, iterative reconstruction, and/or weight base dosing when appropriate to reduce radiation dose when appropriate to reduce radiation dose to as low as reasonably achievable.      Electronically signed by: Michael Francis MD  Date:    08/29/2019  Time:    21:16             Narrative:    EXAMINATION:  CT HEAD WITHOUT CONTRAST    CLINICAL HISTORY:  Confusion/delirium, altered LOC, unexplained;    TECHNIQUE:  Standard  brain CT protocol without IV contrast was performed.    COMPARISON:  None    FINDINGS:  There are chronic appearing ischemic changes lateral to the anterior aspect of the body of the left lateral ventricle.  There is no evidence of an acute ischemic event.  There is no intracranial hemorrhage.  There is no skull fracture. The paranasal sinuses are normal in appearance.                               X-Ray Chest AP Portable (Final result)  Result time 08/29/19 20:43:44    Final result by ARIA Francis Sr., MD (08/29/19 20:43:44)                 Impression:      1. This is a limited examination secondary to the lack of inclusion of the entire thorax on the film. The right costophrenic angle is not completely included on the film.  2. There is a subtle amount of haziness in the lateral aspect of the inferior 3rd of the left lung.  This is characteristic of atelectasis or subtle pneumonia.  .      Electronically signed by: Michael Francis MD  Date:    08/29/2019  Time:    20:43             Narrative:    EXAMINATION:  XR CHEST AP PORTABLE    CLINICAL HISTORY:  Loss of consciousness    COMPARISON:  None    FINDINGS:  This is a limited examination secondary to the lack of inclusion of the entire thorax on the film.  The right costophrenic angle is not completely included on the film.  The size of the heart is normal.  There is a subtle amount of haziness in the lateral aspect of the inferior 3rd of the left lung.  The visualized portion of the right lung is clear.  There is no pneumothorax.  The left costophrenic angle is sharp.                                 The EKG was ordered, reviewed, and independently interpreted by the ED provider.  Interpretation time: 2032  Rate: 52 BPM  Rhythm: sinus bradycardia  Interpretation: Incomplete RBBB. Prolonged QT. No STEMI.           The Emergency Provider reviewed the vital signs and test results, which are outlined above.     ED Discussion     10:32 PM: Per [place] syncope  score, pt is at a high risk for a serious outcome.  Along with patient's clinical setting, I recommended admission for further monitoring and and workup.    10:58 PM: Re-evaluated pt. I have discussed test results, shared treatment plan, and the need for admission with patient and family at bedside. Pt and family express understanding at this time and agree with all information. All questions answered. Pt and family have no further questions or concerns at this time. Pt is ready for admit.    11:09 PM: Discussed case with Jing Vásquez NP ([facility]). Dr. Mayer agrees with current care and management of pt and accepts admission.   Admitting Service: [facility]  Admitting Physician: Dr. Mayer  Admit to: Obs Tele                 Medical Decision Making:   Clinical Tests:   Lab Tests: Reviewed and Ordered  Radiological Study: Ordered and Reviewed  Medical Tests: Reviewed and Ordered  ED Management:  Patient presented to emergency department for syncopal episode.  Upon arrival to the emergency department, patient was evaluated by myself and syncopal workup was initiated.  Patient was found to have an elevated troponin and remained sinus bradycardic throughout ED course.  However vitals have been stable and patient did not require atropine as his heart rate remained in the 50s.  At this time have a clinical suspicion that patient's syncopal episode may be of cardiac etiology and recommended admission.  Patient had received IV fluids in the emergency department and was admitted to Medicine for syncopal workup.  This is discussed with patient and family at bedside who agreed with plan.  All questions were answered appropriately.          ED Medication(s):  Medications   magnesium sulfate 2g in water 50mL IVPB (premix) (has no administration in time range)   potassium chloride SA CR tablet 40 mEq (has no administration in time range)   potassium chloride SA CR tablet 40 mEq (40 mEq Oral Given 8/29/19 2200)  "  potassium chloride 10 mEq in 100 mL IVPB (10 mEq Intravenous New Bag 8/29/19 3045)       New Prescriptions    No medications on file               Scribe Attestation:   Scribe #1: I performed the above scribed service and the documentation accurately describes the services I performed. I attest to the accuracy of the note.     Attending:   Physician Attestation Statement for Scribe #1: I, Samantha Fontenot MD, personally performed the services described in this documentation, as scribed by Khoa Voss, in my presence, and it is both accurate and complete.           Clinical Impression       ICD-10-CM ICD-9-CM   1. LOC (loss of consciousness) R40.20 780.09   2. Bradycardia R00.1 427.89       Disposition:   Disposition: Placed in Observation  Condition: Fair    Portions of this note may have been created with voice recognition software. Occasional "wrong-word" or "sound-a-like" substitutions may have occurred due to the inherent limitations of voice recognition software. Please, read the note carefully and recognize, using context, where substitutions have occurred.          Samantha Fontenot MD  08/31/19 1341    "

## 2019-08-30 NOTE — HOSPITAL COURSE
8/30/19 The patient reports improvement in symptoms overnight. He reports some continued intermittent dizziness but states his symptoms are much improved. CT head was negative, as was his carotid US and ECHO. His initial K+ was 2.6, this was repleted and improved to 3.2. The patient was treated for a UTI with IV rocephin. During his admission the patient was noted to have postural HTN. This improved with PRN meds. The patient was seen and examined today and deemed stable for discharge. The patient is being discharged home on a 5 day course of levaquin to complete treatment of UTI. The patient is on torsemide daily at home which likely contributed to the hypokalemia. Will hold torsemide and resume on Monday 9/2/19. Will discharge home on daily K+ supplement. PT/OT evaluated the patient and recommended home health PT/OT. The patient will follow up with his PCP.

## 2019-08-30 NOTE — PLAN OF CARE
08/30/19 1116   BALDWIN Message   Medicare Outpatient and Observation Notification regarding financial responsibility Given to patient/caregiver;Explained to patient/caregiver;Signed/date by patient/caregiver   Date BALDWIN was signed 08/30/19   Time BALDWIN was signed [id number]

## 2019-08-30 NOTE — ED NOTES
Pt sitting on side of bed - voided 250cc dark conc urine with specimen collected and sent to lab.

## 2019-08-30 NOTE — PROGRESS NOTES
08/30/19 0425   Vital Signs   Temp 96.5 °F (35.8 °C)   Temp src Oral   Pulse (!) 58   Heart Rate Source Monitor   Resp 18   SpO2 99 %   BP (!) 166/73   MAP (mmHg) 105   BP Location Right arm   Patient Position Sitting     Notified Dr. Mayer of pt's BP. Hydralazine 10 mg every 6 prn ordered per Dr. Mayer. Confirmed through read back.

## 2019-08-30 NOTE — HPI
Mr. Penaloza is a 78 y.o. male with a PMHx of HTN, HLD, DM II, h/o CVA with residual left-sided weakness, hypothyroidism and prostate CA.  He presented to the ED with c/o syncope x 1 episode just PTA.  Patient was at [employer] on a motorized scooter when he felt generalized weakness and lightheadedness prior to having LOC.  Associated diaphoresis and pallor.  No aggravating or alleviating factors.  Patient was found unconscious by another , slumped over on the scooter.  EMS was called, and report patient was hypotensive with BP 82/56 but AAO x 4 upon arrival.  Patient reports he did not have much intake today, only eating some pie filling and not much fluids.  He received IVFs en route to ED.  Upon arrival to ED, BP improved to 126/56 and noted to be sinus bradycardia with HR in low-high 50s.  Work-up resulted normal WBC, K 2.6, cr. 1.1, BUN 33, glucose 142, Mg 1.7, troponin 0.03, TSH 3.013, CT of the head negative for acute process, CXR unremarkable.  UA with 3+ leukocytes, 25 WBC and many bacteria.  Patient received PO KCl in ED.  Hospital Medicine was called for admission.

## 2019-08-30 NOTE — ASSESSMENT & PLAN NOTE
- Initial potassium 2.6, replete to keep >/= 4.  - Check Mg.  - Hold home HCTZ for now.  - Follow labs.

## 2019-08-30 NOTE — PT/OT/SLP EVAL
Physical Therapy Evaluation    Patient Name:  Scot Penaloza   MRN:  [id number]    Recommendations:     Discharge Recommendations:  home health PT   Discharge Equipment Recommendations: none   Barriers to discharge: None    Assessment:     Scot Penaloza is a 78 y.o. male admitted with a medical diagnosis of Syncope.  He presents with the following impairments/functional limitations:  weakness, gait instability, impaired balance, impaired functional mobilty, decreased ROM, decreased lower extremity function .    Rehab Prognosis: Good; patient would benefit from acute skilled PT services to address these deficits and reach maximum level of function.    Recent Surgery: * No surgery found *      Plan:     During this hospitalization, patient to be seen   to address the identified rehab impairments via gait training, therapeutic activities, therapeutic exercises and progress toward the following goals:    · Plan of Care Expires:  09/06/19    Subjective     Chief Complaint: WEAKNESS  Patient/Family Comments/goals: GO HOME  Pain/Comfort:  · Pain Rating 1: 0/10  · Pain Rating Post-Intervention 1: 0/10    Patients cultural, spiritual, Rastafari conflicts given the current situation:      Living Environment:  PT LIVES AT HOME WITH DAUGHTER AND HAS 6 STEPS TO ENTER MOBILE HOME WITH B RAILING.    Prior to admission, patients level of function was MOD I WITH RW.  Equipment used at home: walker, rolling, raised toilet, shower chair.  DME owned (not currently used): none.  Upon discharge, patient will have assistance from FAMILY.    Objective:     Communicated with NURSE GIPSON AND [facility] CHART REVIEW prior to session.  Patient found supine with telemetry, peripheral IV  upon PT entry to room.    General Precautions: Standard, fall   Orthopedic Precautions:N/A   Braces: N/A     Exams:  · Cognitive Exam:  Patient is oriented to Person, Place, Time and Situation  · RLE ROM: WFL  · RLE Strength: WFL  · LLE ROM: LIMITED  · LLE  Strength: LIMITED    Functional Mobility:  PT MET IN RM SUP.SIT EOB WITH SBA. PT SCOOTED TO EOB AND STOOD WITH RW AND CGA. PT EDUCATED ON POSTURE. PT ABLE TO SELF CORRECT FOR ERECT POSTURE. PT GT TRAINED X 150' WITH RW AND CGA>SBA. PT RETURNED TO RM T/F TO CHAIR WITH SBA. PT LEFT SEATED AND EDUCATED ON ROLE OF P.T. PT NURSE AWARE PT OOB WITH CHAIR ALARM.    AM-PAC 6 CLICK MOBILITY  Total Score:21     Patient left up in chair with call button in reach, chair alarm on and DAUGHTER present.    GOALS:   Multidisciplinary Problems     Physical Therapy Goals        Problem: Physical Therapy Goal    Goal Priority Disciplines Outcome Goal Variances Interventions   Physical Therapy Goal     PT, PT/OT      Description:  PT WILL BE SEEN FOR P.T. FOR A MIN OF 5 OUT OF 7 DAYS A WEEK  LT19  1. PT WILL COMPLETE B LE TE X 20 REPS  2. PT WILL COMPLETE BED MOBILITY IND  3. PT WILL GT TRAIN X 250' WITH RW MOD I  4. PT WILL T/F TO CHAIR WITH RW MOD I                    History:     Past Medical History:   Diagnosis Date    Cancer     prostate    Diabetes mellitus     Encounter for blood transfusion     [place] (hard of hearing)     Hyperlipidemia     Hypertension     Stroke 2019    Thyroid disease        Past Surgical History:   Procedure Laterality Date    CATARACT EXTRACTION      COLONOSCOPY      INGUINAL HERNIA REPAIR         Time Tracking:     PT Received On: 19  PT Start Time: 926     PT Stop Time: 950  PT Total Time (min): 24 min     Billable Minutes: Evaluation 14 and Gait Training 10      Ronit Castro, PT  2019

## 2019-08-30 NOTE — NURSING
Discharge instructions reviewed with patient and daughter.  Encouraged to go to all follow up appointments.  Prescriptions sent to pharmacy of choice.  IV taken out. Heart monitor off and returned to tech.  Instructed to call for wheelchair once dressed.  Transportation at bedside.  All questions answered.  Primary nurse notified.    Charlene Hagan RN

## 2019-08-30 NOTE — PLAN OF CARE
CM spoke to patient who is awake, alert, and able to make needs known. Patient states that before admission he was using a rolling walker, and does not use any oxygen at home. Patient states that a few months ago he had home health services, but now goes to out patient rehab twice a week in [place]. Family wants to know if Home Health Services can come back out when patient is discharged. CM provided family with laminated providers list and will come back later today to have choice form siCM provided a transitional care folder, information on advanced directives, information on pharmacy bedside delivery, and discharge planning begins on admission with contact information for any needs/questions.    D/C Plan: Home   PCP: Dr. Zhao   Preferred Pharmacy: [facility]  Discharge transportation: family   My [facility]: inactive   Pharmacy Bedside Delivery: declined         08/30/19 1036   Discharge Assessment   Assessment Type Discharge Planning Assessment   Confirmed/corrected address and phone number on facesheet? Yes   Assessment information obtained from? Patient   Expected Length of Stay (days)   (tbd)   Communicated expected length of stay with patient/caregiver yes   Prior to hospitilization cognitive status: Alert/Oriented   Prior to hospitalization functional status: Assistive Equipment   Current cognitive status: Alert/Oriented   Current Functional Status: Assistive Equipment   Facility Arrived From: home    Lives With child(lolly), adult   Able to Return to Prior Arrangements yes   Is patient able to care for self after discharge? Yes   Who are your caregiver(s) and their phone number(s)? Marjorie (daughter) [phone]   Patient's perception of discharge disposition home or selfcare;home health   Patient currently being followed by outpatient case management? No   Patient currently receives any other outside agency services? No   Equipment Currently Used at Home walker, rolling   Do you have any problems  affording any of your prescribed medications? No   Is the patient taking medications as prescribed? yes   Does the patient have transportation home? Yes   Does the patient receive services at the [facility]? No   Discharge Plan A Home with family   DME Needed Upon Discharge  none   Patient/Family in Agreement with Plan yes

## 2019-08-30 NOTE — PT/OT/SLP EVAL
Occupational Therapy   Evaluation    Name: Scot Penaloza  MRN: [id number]  Admitting Diagnosis:  Syncope      Recommendations:     Discharge Recommendations: home health OT  Discharge Equipment Recommendations:  none  Barriers to discharge:  (TBD)    Assessment:     Scot Penaloza is a 78 y.o. male with a medical diagnosis of Syncope.  He presents with SELF CARE DEBILITY. Performance deficits affecting function: weakness, impaired endurance, impaired self care skills, impaired balance, gait instability, impaired functional mobilty.      Rehab Prognosis: Good; patient would benefit from acute skilled OT services to address these deficits and reach maximum level of function.       Plan:     Patient to be seen 3 x/week to address the above listed problems via self-care/home management, therapeutic activities, therapeutic exercises  · Plan of Care Expires: 08/30/19  · Plan of Care Reviewed with: patient, daughter    Subjective     Chief Complaint: C/O DECREASED NECK EXTENSION.  Patient/Family Comments/goals: INCREASE NECK EXTENSION    Occupational Profile:  Living Environment: PT LIVES WITH DTR AND GRANDSON IN MOBILE HOME WITH 6 STEPS AND HANDRAILS.  Previous level of function: NO DRIVING.  Roles and Routines: DTR (A) PT AS NEEDED WITH SELF CARE TASKS.  Equipment Used at Home:  raised toilet, walker, rolling, shower chair  Assistance upon Discharge: DTR    Pain/Comfort:  · Pain Rating 1: 0/10    Patients cultural, spiritual, Sabianism conflicts given the current situation: no    Objective:     Communicated with: NURSE prior to session.  Patient found supine with telemetry, peripheral IV upon OT entry to room.    General Precautions: Standard, fall   Orthopedic Precautions:N/A   Braces: N/A     Occupational Performance:    Bed Mobility:    · Patient completed Rolling/Turning to Left with  minimum assistance  · Patient completed Rolling/Turning to Right with minimum assistance  · Patient completed Scooting/Bridging  with minimum assistance  · Patient completed Supine to Sit with minimum assistance  · Patient completed Sit to Supine with minimum assistance    Functional Mobility/Transfers:  · Patient completed Sit <> Stand Transfer with minimum assistance  with  rolling walker   · Patient completed Bed <> Chair Transfer using Stand Pivot technique with minimum assistance with rolling walker  · Functional Mobility: MIN (A)    Activities of Daily Living:  · Upper Body Dressing: moderate assistance DUE TO IV LINE  · Lower Body Dressing: moderate assistance        Cognitive/Visual Perceptual:  APPEARS INTACT    Physical Exam:  Balance:    -       MIN (A) WITH RW    AMPAC 6 Click ADL:  AMPAC Total Score: 18    Treatment & Education:  EVAL COMPLETED.  PT PRACTICED LB DRESSING EOB.  Education:    Patient left up in chair with all lines intact, call button in reach, chair alarm on and DTR present    GOALS:   Multidisciplinary Problems     Occupational Therapy Goals        Problem: Occupational Therapy Goal    Goal Priority Disciplines Outcome Interventions   Occupational Therapy Goal     OT, PT/OT     Description:  1)  PT WILL PERFORM X15 REPS OF BUE AROM WITH FOCUS ON INCREASED ENDURANCE.  2)  PT WILL PERFORM NECK AROM ALL PLANES WITH GOAL OF DECREASED NECK FLEXION.  3)  PT WILL PERFORM LB DRESSING WITH AE PRN WITH MIN (A).  4)  PT WILL T/F EOB <> BSC WITH MIN (A).                    History:     Past Medical History:   Diagnosis Date    Cancer     prostate    Diabetes mellitus     Encounter for blood transfusion     [place] (hard of hearing)     Hyperlipidemia     Hypertension     Stroke 2019    Thyroid disease        Past Surgical History:   Procedure Laterality Date    CATARACT EXTRACTION      COLONOSCOPY      INGUINAL HERNIA REPAIR         Time Tracking:     OT Date of Treatment: 08/30/19  OT Start Time: 0928  OT Stop Time: 0955  OT Total Time (min): 27 min    Billable Minutes:Evaluation 15  Self Care/Home Management  12    Natalee Maier, OT  8/30/2019

## 2019-08-30 NOTE — ED NOTES
Pt in wc to bathroom - states needs to have a BM.  In wc to bathroom with assistance.  Pt states only had gas.  Depends in place.  Back to bed with assistance.  Pt aware of admit.

## 2019-08-30 NOTE — DISCHARGE SUMMARY
[facility] Hospital Medicine  Discharge Summary      Patient Name: Scot Penaloza  MRN: [id number]  Admission Date: 8/29/2019  Hospital Length of Stay: 0 days  Discharge Date and Time:  08/30/2019 6:04 PM  Attending Physician: Olegario Becerra MD   Discharging Provider: Car Church NP  Primary Care Provider: Michael Zhao MD      HPI:   Mr. Penaloza is a 78 y.o. male with a PMHx of HTN, HLD, DM II, h/o CVA with residual left-sided weakness, hypothyroidism and prostate CA.  He presented to the ED with c/o syncope x 1 episode just PTA.  Patient was at [place] on a motorized scooter when he felt generalized weakness and lightheadedness prior to having LOC.  Associated diaphoresis and pallor.  No aggravating or alleviating factors.  Patient was found unconscious by another , slumped over on the scooter.  EMS was called, and report patient was hypotensive with BP 82/56 but AAO x 4 upon arrival.  Patient reports he did not have much intake today, only eating some pie filling and not much fluids.  He received IVFs en route to ED.  Upon arrival to ED, BP improved to 126/56 and noted to be sinus bradycardia with HR in low-high 50s.  Work-up resulted normal WBC, K 2.6, cr. 1.1, BUN 33, glucose 142, Mg 1.7, troponin 0.03, TSH 3.013, CT of the head negative for acute process, CXR unremarkable.  UA with 3+ leukocytes, 25 WBC and many bacteria.  Patient received PO KCl in ED.  Hospital Medicine was called for admission.       * No surgery found *      Hospital Course:   8/30/19 The patient reports improvement in symptoms overnight. He reports some continued intermittent dizziness but states his symptoms are much improved. CT head was negative, as was his carotid US and ECHO. His initial K+ was 2.6, this was repleted and improved to 3.2. The patient was treated for a UTI with IV rocephin. During his admission the patient was noted to have postural HTN. This improved with PRN meds. The  patient was seen and examined today and deemed stable for discharge. The patient is being discharged home on a 5 day course of levaquin to complete treatment of UTI. The patient is on torsemide daily at home which likely contributed to the hypokalemia. Will hold torsemide and resume on Monday 9/2/19. Will discharge home on daily K+ supplement. PT/OT evaluated the patient and recommended home health PT/OT. The patient will follow up with his PCP.      Consults:     No new Assessment & Plan notes have been filed under this hospital service since the last note was generated.  Service: Hospital Medicine    Final Active Diagnoses:    Diagnosis Date Noted POA    Sinus bradycardia [R00.1] 08/30/2019 Yes    Hypokalemia [E87.6] 08/30/2019 Yes    UTI (urinary tract infection) [N39.0] 08/30/2019 Yes    History of CVA with residual deficit [I69.30] 08/30/2019 Not Applicable     Chronic    Type 2 diabetes mellitus, without long-term current use of insulin [E11.9] 03/08/2017 Yes     Chronic      Problems Resolved During this Admission:    Diagnosis Date Noted Date Resolved POA    PRINCIPAL PROBLEM:  Syncope [R55] 08/29/2019 08/30/2019 Yes    Hypotension [I95.9] 08/30/2019 08/30/2019 Yes    Elevated troponin [R74.8] 08/30/2019 08/30/2019 Yes       Discharged Condition: stable    Disposition: Home or Self Care    Follow Up:  Follow-up Information     Michael Zhao MD. Schedule an appointment as soon as possible for a visit in 3 days.    Specialty:  Family Medicine  Contact information:  [id number] [facility] [id number] [phone]                 Patient Instructions:   No discharge procedures on file.    Significant Diagnostic Studies:   Imaging Results          CT Cervical Spine Without Contrast (Final result)  Result time 08/29/19 21:21:37    Final result by ARIA Francis Sr., MD (08/29/19 21:21:37)                 Impression:      1. There is reversal of the normal cervical lordosis.  2. 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  3. There is grade 1 anterolisthesis of C4 on C5.  4. There are areas of relative radiolucency scattered throughout the visualized skeletal system.  If additional imaging evaluation is clinically indicated, recommend consideration of a nonemergent nuclear medicine bone scan.  All CT scans at this facility use dose modulation, iterative reconstruction, and/or weight base dosing when appropriate to reduce radiation dose when appropriate to reduce radiation dose to as low as reasonably achievable.      Electronically signed by: Michael Francis MD  Date:    08/29/2019  Time:    21:21             Narrative:    EXAMINATION:  CT CERVICAL SPINE WITHOUT CONTRAST    CLINICAL HISTORY:  Loss of consciousness    TECHNIQUE:  Standard cervical spine CT protocol was performed without IV contrast.    COMPARISON:  None    FINDINGS:  There is reversal of the normal cervical lordosis.  There is a large posterior bridging osteophyte between the C5 and C7 vertebral bodies.  There is grade 1 anterolisthesis of C4 on C5.  There is no fracture or scoliosis.  There are areas of relative radiolucency scattered throughout the visualized skeletal system.  The soft tissue of the neck is normal in appearance.                               CT Head Without Contrast (Final result)  Result time 08/29/19 21:16:46    Final result by ARIA Francis Sr., MD (08/29/19 21:16:46)                 Impression:      1. There are chronic appearing ischemic changes lateral to the anterior aspect of the body of the left lateral ventricle. There is no evidence of an acute ischemic event.  2. There is no intracranial hemorrhage.  All CT scans at this facility use dose modulation, iterative reconstruction, and/or weight base dosing when appropriate to reduce radiation dose when appropriate to reduce radiation dose to as low as reasonably achievable.      Electronically signed  by: Michael Francis MD  Date:    08/29/2019  Time:    21:16             Narrative:    EXAMINATION:  CT HEAD WITHOUT CONTRAST    CLINICAL HISTORY:  Confusion/delirium, altered LOC, unexplained;    TECHNIQUE:  Standard brain CT protocol without IV contrast was performed.    COMPARISON:  None    FINDINGS:  There are chronic appearing ischemic changes lateral to the anterior aspect of the body of the left lateral ventricle.  There is no evidence of an acute ischemic event.  There is no intracranial hemorrhage.  There is no skull fracture. The paranasal sinuses are normal in appearance.                               X-Ray Chest AP Portable (Final result)  Result time 08/29/19 20:43:44    Final result by ARIA Francis Sr., MD (08/29/19 20:43:44)                 Impression:      1. This is a limited examination secondary to the lack of inclusion of the entire thorax on the film. The right costophrenic angle is not completely included on the film.  2. There is a subtle amount of haziness in the lateral aspect of the inferior 3rd of the left lung.  This is characteristic of atelectasis or subtle pneumonia.  .      Electronically signed by: Michael Francis MD  Date:    08/29/2019  Time:    20:43             Narrative:    EXAMINATION:  XR CHEST AP PORTABLE    CLINICAL HISTORY:  Loss of consciousness    COMPARISON:  None    FINDINGS:  This is a limited examination secondary to the lack of inclusion of the entire thorax on the film.  The right costophrenic angle is not completely included on the film.  The size of the heart is normal.  There is a subtle amount of haziness in the lateral aspect of the inferior 3rd of the left lung.  The visualized portion of the right lung is clear.  There is no pneumothorax.  The left costophrenic angle is sharp.                                  Pending Diagnostic Studies:     None         Medications:  Reconciled Home Medications:      Medication List      START taking these medications     levoFLOXacin 750 MG tablet  Commonly known as:  LEVAQUIN  Take 1 tablet (750 mg total) by mouth once daily. for 5 days     potassium chloride SA 20 MEQ tablet  Commonly known as:  K-DUR,KLOR-CON  Take 1 tablet (20 mEq total) by mouth once daily.        CHANGE how you take these medications    torsemide 10 MG Tab  Commonly known as:  DEMADEX  Take 2 tablets (20 mg total) by mouth once daily.  Start taking on:  9/2/2019  What changed:  These instructions start on 9/2/2019. If you are unsure what to do until then, ask your doctor or other care provider.        CONTINUE taking these medications    aspirin 81 MG EC tablet  Commonly known as:  ECOTRIN  Take 81 mg by mouth once daily.     dextromethorphan-guaifenesin  mg  mg per 12 hr tablet  Commonly known as:  MUCINEX DM  Take 1 tablet by mouth every 12 (twelve) hours.     levothyroxine 75 MCG tablet  Commonly known as:  SYNTHROID  Take 75 mcg by mouth.     lisinopril-hydrochlorothiazide 20-25 mg Tab  Commonly known as:  PRINZIDE,ZESTORETIC  Take 1 tablet by mouth once daily.     metFORMIN 500 MG tablet  Commonly known as:  GLUCOPHAGE  Take 500 mg by mouth.     metoprolol tartrate 25 MG tablet  Commonly known as:  LOPRESSOR  Take 25 mg by mouth.     MYRBETRIQ 50 mg Tb24  Generic drug:  mirabegron  Take by mouth.     simvastatin 20 MG tablet  Commonly known as:  ZOCOR  Take 20 mg by mouth every evening.     tamsulosin 0.4 mg Cap  Commonly known as:  FLOMAX  Take 0.4 mg by mouth.            Indwelling Lines/Drains at time of discharge:   Lines/Drains/Airways          None          Time spent on the discharge of patient: > 30 minutes  Patient was seen and examined on the date of discharge and determined to be suitable for discharge.         Car Church NP  Department of Hospital Medicine  [facility]

## 2019-08-30 NOTE — ASSESSMENT & PLAN NOTE
- Resolved.  - TSH WNL.  - Replete electrolytes.  - Hold home beta blocker for now.    - Monitor telemetry.

## 2019-08-30 NOTE — ED NOTES
"Armband checked, patient verified. Verified by spelling and stated name on armband along with .   LOC: The patient is awake, alert and aware of environment with an appropriate affect, the patient is oriented x 3 and speaking appropriately. Pt [place] with hearing aids in place radha.   APPEARANCE: Patient resting comfortably and in no acute distress, patient is clean and well groomed  SKIN: The skin is warm and dry, color consistent with ethnicity, patient has normal skin turgor and moist mucus membranes, no breakdown or bruising noted.  MUSCULOSKELETAL: Patient moving all extremities to command, pt has left sided weakness from stroke 6 months ago.   RESPIRATORY: Airway is open and patent, respirations are regular, even and non labored.  CARDIAC: Patient has a normal rate, periphreal edema 1+ noted, capillary refill < 3 seconds.  ABDOMEN: Soft and non tender to palpation.  Pt states was at [employer] on scooter and shopping when felt weak and dizzy. Pt found unconscious by another  slumped over on scooter.  Pt states he has hx of getting weak when getting up fast but today only ate some pie filling.  EMS on scene found pt to be hypotensive, .  IV fluids given per EMS and pt was awake upon arrival of EMS.  Pt states saw PCP recently with labs done.  Denies chest pain or any pain or discomfort at this time. States "still feeling a little weak."  SR up x 2 with call light in reach.  Family at bedside.   "

## 2019-08-30 NOTE — NURSING
AVS has been thoroughly reviewed with patient and copy was given by MERISSA Godfrey.  IV's and cardiac monitor have been removed. Instructed to call for wheelchair when ready for discharge.

## 2019-08-30 NOTE — ASSESSMENT & PLAN NOTE
- Likely neurocardiogenic.  CT of the head negative for acute process.  - Monitor telemetry for tachy/brendan arrhythmias.  - Continue IV hydration.  Check orthostatic VS.  - Will obtain carotid US and 2D echo.  - Neuro checks.  - Fall precautions.

## 2019-09-01 LAB — BACTERIA UR CULT: ABNORMAL

## 2019-09-03 NOTE — PLAN OF CARE
09/03/19 1429   Final Note   Assessment Type Final Discharge Note   Anticipated Discharge Disposition Home   Right Care Referral Info   Post Acute Recommendation No Care

## 2019-09-03 NOTE — PLAN OF CARE
09/03/19 1429   Post-Acute Status   Post-Acute Authorization Other   Other Status No Post-Acute Service Needs

## 2019-10-18 ENCOUNTER — OFFICE VISIT (OUTPATIENT)
Dept: FAMILY MEDICINE | Facility: CLINIC | Age: 79
End: 2019-10-18
Payer: MEDICARE

## 2019-10-18 ENCOUNTER — LAB VISIT (OUTPATIENT)
Dept: LAB | Facility: HOSPITAL | Age: 79
End: 2019-10-18
Payer: MEDICARE

## 2019-10-18 VITALS
OXYGEN SATURATION: 98 % | DIASTOLIC BLOOD PRESSURE: 76 MMHG | WEIGHT: 205.38 LBS | HEART RATE: 68 BPM | SYSTOLIC BLOOD PRESSURE: 138 MMHG | BODY MASS INDEX: 27.82 KG/M2 | HEIGHT: 72 IN | TEMPERATURE: 98 F

## 2019-10-18 DIAGNOSIS — E03.9 ACQUIRED HYPOTHYROIDISM: ICD-10-CM

## 2019-10-18 DIAGNOSIS — E78.2 MIXED HYPERLIPIDEMIA: ICD-10-CM

## 2019-10-18 DIAGNOSIS — I10 HYPERTENSION, UNSPECIFIED TYPE: ICD-10-CM

## 2019-10-18 DIAGNOSIS — E87.6 HYPOKALEMIA: ICD-10-CM

## 2019-10-18 DIAGNOSIS — Z00.00 WELL ADULT EXAM: ICD-10-CM

## 2019-10-18 DIAGNOSIS — Z00.00 WELL ADULT EXAM: Primary | ICD-10-CM

## 2019-10-18 LAB
ALBUMIN SERPL BCP-MCNC: 3.9 G/DL (ref 3.5–5.2)
ALP SERPL-CCNC: 51 U/L (ref 55–135)
ALT SERPL W/O P-5'-P-CCNC: 12 U/L (ref 10–44)
ANION GAP SERPL CALC-SCNC: 12 MMOL/L (ref 8–16)
AST SERPL-CCNC: 16 U/L (ref 10–40)
BASOPHILS # BLD AUTO: 0.06 K/UL (ref 0–0.2)
BASOPHILS NFR BLD: 1.3 % (ref 0–1.9)
BILIRUB SERPL-MCNC: 0.5 MG/DL (ref 0.1–1)
BUN SERPL-MCNC: 12 MG/DL (ref 8–23)
CALCIUM SERPL-MCNC: 9.3 MG/DL (ref 8.7–10.5)
CHLORIDE SERPL-SCNC: 93 MMOL/L (ref 95–110)
CHOLEST SERPL-MCNC: 156 MG/DL (ref 120–199)
CHOLEST/HDLC SERPL: 2.6 {RATIO} (ref 2–5)
CO2 SERPL-SCNC: 29 MMOL/L (ref 23–29)
CREAT SERPL-MCNC: 0.9 MG/DL (ref 0.5–1.4)
DIFFERENTIAL METHOD: ABNORMAL
EOSINOPHIL # BLD AUTO: 0.1 K/UL (ref 0–0.5)
EOSINOPHIL NFR BLD: 1.9 % (ref 0–8)
ERYTHROCYTE [DISTWIDTH] IN BLOOD BY AUTOMATED COUNT: 12.9 % (ref 11.5–14.5)
EST. GFR  (AFRICAN AMERICAN): >60 ML/MIN/1.73 M^2
EST. GFR  (NON AFRICAN AMERICAN): >60 ML/MIN/1.73 M^2
GLUCOSE SERPL-MCNC: 128 MG/DL (ref 70–110)
HCT VFR BLD AUTO: 35.1 % (ref 40–54)
HDLC SERPL-MCNC: 61 MG/DL (ref 40–75)
HDLC SERPL: 39.1 % (ref 20–50)
HGB BLD-MCNC: 12 G/DL (ref 14–18)
IMM GRANULOCYTES # BLD AUTO: 0.01 K/UL (ref 0–0.04)
IMM GRANULOCYTES NFR BLD AUTO: 0.2 % (ref 0–0.5)
LDLC SERPL CALC-MCNC: 78 MG/DL (ref 63–159)
LYMPHOCYTES # BLD AUTO: 1 K/UL (ref 1–4.8)
LYMPHOCYTES NFR BLD: 22.1 % (ref 18–48)
MCH RBC QN AUTO: 32.2 PG (ref 27–31)
MCHC RBC AUTO-ENTMCNC: 34.2 G/DL (ref 32–36)
MCV RBC AUTO: 94 FL (ref 82–98)
MONOCYTES # BLD AUTO: 0.5 K/UL (ref 0.3–1)
MONOCYTES NFR BLD: 10 % (ref 4–15)
NEUTROPHILS # BLD AUTO: 3 K/UL (ref 1.8–7.7)
NEUTROPHILS NFR BLD: 64.5 % (ref 38–73)
NONHDLC SERPL-MCNC: 95 MG/DL
NRBC BLD-RTO: 0 /100 WBC
PLATELET # BLD AUTO: 276 K/UL (ref 150–350)
PMV BLD AUTO: 10.5 FL (ref 9.2–12.9)
POTASSIUM SERPL-SCNC: 3.2 MMOL/L (ref 3.5–5.1)
PROT SERPL-MCNC: 6.2 G/DL (ref 6–8.4)
RBC # BLD AUTO: 3.73 M/UL (ref 4.6–6.2)
SODIUM SERPL-SCNC: 134 MMOL/L (ref 136–145)
TRIGL SERPL-MCNC: 85 MG/DL (ref 30–150)
WBC # BLD AUTO: 4.7 K/UL (ref 3.9–12.7)

## 2019-10-18 PROCEDURE — 80053 COMPREHEN METABOLIC PANEL: CPT

## 2019-10-18 PROCEDURE — 99204 OFFICE O/P NEW MOD 45 MIN: CPT | Mod: S$GLB,,, | Performed by: FAMILY MEDICINE

## 2019-10-18 PROCEDURE — 1101F PR PT FALLS ASSESS DOC 0-1 FALLS W/OUT INJ PAST YR: ICD-10-PCS | Mod: CPTII,S$GLB,, | Performed by: FAMILY MEDICINE

## 2019-10-18 PROCEDURE — 99204 PR OFFICE/OUTPT VISIT, NEW, LEVL IV, 45-59 MIN: ICD-10-PCS | Mod: S$GLB,,, | Performed by: FAMILY MEDICINE

## 2019-10-18 PROCEDURE — 36415 COLL VENOUS BLD VENIPUNCTURE: CPT | Mod: PO

## 2019-10-18 PROCEDURE — 99999 PR PBB SHADOW E&M-EST. PATIENT-LVL III: ICD-10-PCS | Mod: PBBFAC,,, | Performed by: FAMILY MEDICINE

## 2019-10-18 PROCEDURE — 80061 LIPID PANEL: CPT

## 2019-10-18 PROCEDURE — 99999 PR PBB SHADOW E&M-EST. PATIENT-LVL III: CPT | Mod: PBBFAC,,, | Performed by: FAMILY MEDICINE

## 2019-10-18 PROCEDURE — 85025 COMPLETE CBC W/AUTO DIFF WBC: CPT

## 2019-10-18 PROCEDURE — 1101F PT FALLS ASSESS-DOCD LE1/YR: CPT | Mod: CPTII,S$GLB,, | Performed by: FAMILY MEDICINE

## 2019-10-18 RX ORDER — TORSEMIDE 10 MG/1
10 TABLET ORAL DAILY
Qty: 30 TABLET | Refills: 5 | Status: SHIPPED | OUTPATIENT
Start: 2019-10-18

## 2019-10-18 RX ORDER — POTASSIUM CHLORIDE 20 MEQ/1
60 TABLET, EXTENDED RELEASE ORAL DAILY
Qty: 90 TABLET | Refills: 3 | Status: SHIPPED | OUTPATIENT
Start: 2019-10-18 | End: 2020-03-23

## 2019-10-18 RX ORDER — FUROSEMIDE 20 MG/1
20 TABLET ORAL DAILY
Qty: 30 TABLET | Refills: 3 | Status: SHIPPED | OUTPATIENT
Start: 2019-10-18 | End: 2020-01-28

## 2019-10-18 RX ORDER — FUROSEMIDE 20 MG/1
1 TABLET ORAL DAILY
Refills: 0 | COMMUNITY
Start: 2019-09-09 | End: 2019-10-18 | Stop reason: SDUPTHER

## 2019-10-20 NOTE — PROGRESS NOTES
Chief Complaint:    Chief Complaint   Patient presents with    Establish Care       History of Present Illness:  Presents today to [facility].  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  He has been doing some physical therapy  He also has diabetes for few years he says diabetes been well controlled.      He has had prostate cancer currently on radiation therapy and now he is on hormone therapy.  Patient's oncologist is Dr. Fonseca and his urologist is Dr. Chakraborty    He complains of a lesion in the right ear he had a melanoma excision there.    He also sees a cardiologist and he has had issues with low potassium is taking potassium pills the diuretics that he is on were increased and ever since his potassium is been an issue.      ROS:  Review of Systems   Constitutional: Negative for activity change, chills, fatigue, fever and unexpected weight change.   HENT: Negative for congestion, ear discharge, ear pain, hearing loss, postnasal drip and rhinorrhea.    Eyes: Negative for pain and visual disturbance.   Respiratory: Negative for cough, chest tightness and shortness of breath.    Cardiovascular: Negative for chest pain and palpitations.   Gastrointestinal: Negative for abdominal pain, diarrhea and vomiting.   Endocrine: Negative for heat intolerance.   Genitourinary: Negative for dysuria, flank pain, frequency and hematuria.   Musculoskeletal: Negative for back pain, gait problem and neck pain.   Skin: Negative for color change and rash.   Neurological: Negative for dizziness, tremors, seizures, numbness and headaches.   Psychiatric/Behavioral: Negative for agitation, hallucinations, self-injury, sleep disturbance and suicidal ideas. The patient is not nervous/anxious.        Past Medical History:   Diagnosis Date    Cancer     prostate    Diabetes mellitus     Encounter for blood transfusion     [place] (hard of hearing)     Hyperlipidemia      Hypertension     Stroke 2019    Thyroid disease        Social History:  Social History     Socioeconomic History    Marital status:      Spouse name: Not on file    Number of children: Not on file    Years of education: Not on file    Highest education level: Not on file   Occupational History    Not on file   Social Needs    Financial resource strain: Not on file    Food insecurity:     Worry: Not on file     Inability: Not on file    Transportation needs:     Medical: Not on file     Non-medical: Not on file   Tobacco Use    Smoking status: Never Smoker    Smokeless tobacco: Never Used   Substance and Sexual Activity    Alcohol use: Not Currently    Drug use: Never    Sexual activity: Not Currently   Lifestyle    Physical activity:     Days per week: Not on file     Minutes per session: Not on file    Stress: Not on file   Relationships    Social connections:     Talks on phone: Not on file     Gets together: Not on file     Attends Oriental orthodox service: Not on file     Active member of club or organization: Not on file     Attends meetings of clubs or organizations: Not on file     Relationship status: Not on file   Other Topics Concern    Not on file   Social History Narrative    Not on file       Family History:   family history includes Cancer in his mother; Stroke in his father and mother.    Health Maintenance   Topic Date Due    Foot Exam  09/07/1952    Eye Exam  09/07/1952    TETANUS VACCINE  09/07/1960    Pneumococcal Vaccine (65+ High/Highest Risk) (1 of 2 - PCV13) 09/07/2007    Hemoglobin A1c  02/29/2020    Lipid Panel  10/18/2020       Physical Exam:    Vital Signs  Temp: 97.9 °F (36.6 °C)  Temp src: Temporal  Pulse: 68  SpO2: 98 %  BP: 138/76  BP Location: Left arm  Patient Position: Sitting  Pain Score: 0-No pain  Height and Weight  Height: 6' (182.9 cm)  Weight: 93.2 kg (205 lb 5.7 oz)  BSA (Calculated - sq m): 2.18 sq meters  BMI (Calculated): 27.9  Weight in (lb) to  have BMI = 25: 183.9]    Body mass index is 27.85 kg/m².    Physical Exam   Constitutional: He is oriented to person, place, and time. He appears well-developed.   HENT:   Mouth/Throat: Oropharynx is clear and moist.   The right earlobe has a thickening appears to be a growth of some type, there is similar growth on his back is could be callus   Eyes: Pupils are equal, round, and reactive to light. Conjunctivae are normal.   Neck: Normal range of motion. Neck supple.   Cardiovascular: Normal rate, regular rhythm and normal heart sounds.   No murmur heard.  Pulmonary/Chest: Effort normal and breath sounds normal. No respiratory distress. He has no wheezes. He has no rales. He exhibits no tenderness.   Abdominal: Soft. He exhibits no distension and no mass. There is no tenderness. There is no guarding.   Musculoskeletal: He exhibits no edema or tenderness.   Patient is wheelchair-bound, requires assistance to stand up   Lymphadenopathy:     He has no cervical adenopathy.   Neurological: He is alert and oriented to person, place, and time. He has normal reflexes.   Skin: Skin is warm and dry.   Psychiatric: He has a normal mood and affect. His behavior is normal. Judgment and thought content normal.         Assessment:      ICD-10-CM ICD-9-CM   1. Well adult exam Z00.00 V70.0   2. Hypokalemia E87.6 276.8   3. Hypertension, unspecified type I10 401.9   4. Mixed hyperlipidemia E78.2 272.2   5. Acquired hypothyroidism E03.9 244.9         Plan:        Recommend that he sees dermatologist for biopsy of the lesion on the ear and the back to see if it is cancers.  Check labs to see if he has hypokalemia  Will check TSH levels  Continue therapy  Diabetes is been stable last A1c was 6.8        Orders Placed This Encounter   Procedures    CBC auto differential    Lipid panel    Comprehensive metabolic panel       Current Outpatient Medications   Medication Sig Dispense Refill    aspirin (ECOTRIN) 81 MG EC tablet Take 81 mg  by mouth once daily.      furosemide (LASIX) 20 MG tablet Take 1 tablet (20 mg total) by mouth once daily. 30 tablet 3    levothyroxine (SYNTHROID) 75 MCG tablet Take 75 mcg by mouth.      lisinopril-hydrochlorothiazide (PRINZIDE,ZESTORETIC) 20-25 mg Tab Take 1 tablet by mouth 2 (two) times daily.       metFORMIN (GLUCOPHAGE) 500 MG tablet Take 500 mg by mouth 2 (two) times daily with meals.       metoprolol tartrate (LOPRESSOR) 25 MG tablet Take 25 mg by mouth 2 (two) times daily.       mirabegron (MYRBETRIQ) 50 mg Tb24 Take 1 tablet by mouth once daily.       potassium chloride SA (K-DUR,KLOR-CON) 20 MEQ tablet Take 3 tablets (60 mEq total) by mouth once daily. 90 tablet 3    simvastatin (ZOCOR) 20 MG tablet Take 20 mg by mouth every evening.      tamsulosin (FLOMAX) 0.4 mg Cap Take 0.4 mg by mouth 2 (two) times daily.       torsemide (DEMADEX) 10 MG Tab Take 1 tablet (10 mg total) by mouth once daily. 30 tablet 5     No current facility-administered medications for this visit.        Medications Discontinued During This Encounter   Medication Reason    dextromethorphan-guaifenesin  mg (MUCINEX DM)  mg per 12 hr tablet Patient no longer taking    torsemide (DEMADEX) 10 MG Tab Reorder    potassium chloride SA (K-DUR,KLOR-CON) 20 MEQ tablet Reorder    furosemide (LASIX) 20 MG tablet Reorder       Follow up in about 3 months (around 1/18/2020).      Cleve Nam MD

## 2019-10-21 DIAGNOSIS — D64.9 ANEMIA, UNSPECIFIED TYPE: Primary | ICD-10-CM

## 2019-10-21 DIAGNOSIS — E87.6 POTASSIUM (K) DEFICIENCY: ICD-10-CM

## 2019-10-21 RX ORDER — METFORMIN HYDROCHLORIDE 500 MG/1
500 TABLET ORAL 2 TIMES DAILY WITH MEALS
Qty: 60 TABLET | Refills: 6 | Status: SHIPPED | OUTPATIENT
Start: 2019-10-21 | End: 2020-08-16

## 2019-10-21 NOTE — TELEPHONE ENCOUNTER
----- Message from Jayme Healy sent at 10/21/2019  3:08 PM CDT -----  Contact: Marjorie LeonPT's daughter  Type:  Test Results    Who Called: Marjorie Leonpt's daughter  Name of Test (Lab/Mammo/Etc): labs  Date of Test: 10/18/19  Ordering Provider:  Deep  Where the test was performed: [facility]  Would the patient rather a call back or a response via [facility]? Call back   Best Call Back Number: [phone]  Additional Information: She is calling back in regards  to getting further clarification on the patient's test results due to the patient being confused and not able to understand the results,please advise.

## 2019-10-22 ENCOUNTER — TELEPHONE (OUTPATIENT)
Dept: FAMILY MEDICINE | Facility: CLINIC | Age: 79
End: 2019-10-22

## 2019-10-22 NOTE — TELEPHONE ENCOUNTER
----- Message from Tessa Terrazas sent at 10/22/2019 10:32 AM CDT -----  Contact: pt   She's calling in regards to results     pls call pt back at [phone] (home)

## 2019-10-26 RX ORDER — LEVOTHYROXINE SODIUM 75 UG/1
TABLET ORAL
Qty: 30 TABLET | Refills: 0 | Status: SHIPPED | OUTPATIENT
Start: 2019-10-26 | End: 2019-11-29 | Stop reason: SDUPTHER

## 2019-10-30 RX ORDER — METOPROLOL TARTRATE 25 MG/1
25 TABLET, FILM COATED ORAL 2 TIMES DAILY
Qty: 180 TABLET | Refills: 1 | Status: SHIPPED | OUTPATIENT
Start: 2019-10-30 | End: 2020-05-05

## 2019-10-30 NOTE — TELEPHONE ENCOUNTER
----- Message from Scarletjoshuajusten Coombs sent at 10/30/2019  9:17 AM CDT -----  Contact: Pt   .Type:  RX Refill Request    Who Called: Pt  Refill or New Rx: refill   RX Name and Strength: METOPROLOL TARTRATE  How is the patient currently taking it? (ex. 1XDay): twice a day   Is this a 30 day or 90 day RX: 90 day   Preferred Pharmacy with phone number: .  [id number] - [place], [place] - [address] [facility] [address]  [address] [facility] [address]  Phone: [phone] Fax: [phone]  Local or Mail Order:  Ordering Provider: Cyril   Would the patient rather a call back or a response via [facility]? Call back   Best Call Back Number: .[phone] (home)   Additional Information:

## 2019-11-12 ENCOUNTER — DOCUMENTATION ONLY (OUTPATIENT)
Dept: FAMILY MEDICINE | Facility: CLINIC | Age: 79
End: 2019-11-12

## 2019-11-12 NOTE — PROGRESS NOTES
Patients spouse Debbie came by the office on 11/01 inquiring about repeat labs. I have left several messages for her to return our call. Patient needs repeat labs in 1 week from last lab and then will need another repeat in 6 weeks(has been scheduled).

## 2019-11-30 RX ORDER — LEVOTHYROXINE SODIUM 75 UG/1
TABLET ORAL
Qty: 30 TABLET | Refills: 0 | Status: SHIPPED | OUTPATIENT
Start: 2019-11-30 | End: 2020-01-02

## 2019-12-03 ENCOUNTER — LAB VISIT (OUTPATIENT)
Dept: LAB | Facility: HOSPITAL | Age: 79
End: 2019-12-03
Attending: FAMILY MEDICINE
Payer: MEDICARE

## 2019-12-03 DIAGNOSIS — D64.9 ANEMIA, UNSPECIFIED TYPE: ICD-10-CM

## 2019-12-03 LAB
BASOPHILS # BLD AUTO: 0.08 K/UL (ref 0–0.2)
BASOPHILS NFR BLD: 1.2 % (ref 0–1.9)
DIFFERENTIAL METHOD: ABNORMAL
EOSINOPHIL # BLD AUTO: 0.1 K/UL (ref 0–0.5)
EOSINOPHIL NFR BLD: 2 % (ref 0–8)
ERYTHROCYTE [DISTWIDTH] IN BLOOD BY AUTOMATED COUNT: 12.2 % (ref 11.5–14.5)
HCT VFR BLD AUTO: 38.5 % (ref 40–54)
HGB BLD-MCNC: 12.7 G/DL (ref 14–18)
IMM GRANULOCYTES # BLD AUTO: 0.02 K/UL (ref 0–0.04)
IMM GRANULOCYTES NFR BLD AUTO: 0.3 % (ref 0–0.5)
LYMPHOCYTES # BLD AUTO: 1.6 K/UL (ref 1–4.8)
LYMPHOCYTES NFR BLD: 23.5 % (ref 18–48)
MCH RBC QN AUTO: 31.2 PG (ref 27–31)
MCHC RBC AUTO-ENTMCNC: 33 G/DL (ref 32–36)
MCV RBC AUTO: 95 FL (ref 82–98)
MONOCYTES # BLD AUTO: 0.7 K/UL (ref 0.3–1)
MONOCYTES NFR BLD: 9.8 % (ref 4–15)
NEUTROPHILS # BLD AUTO: 4.3 K/UL (ref 1.8–7.7)
NEUTROPHILS NFR BLD: 63.2 % (ref 38–73)
NRBC BLD-RTO: 0 /100 WBC
PLATELET # BLD AUTO: 268 K/UL (ref 150–350)
PMV BLD AUTO: 10.4 FL (ref 9.2–12.9)
RBC # BLD AUTO: 4.07 M/UL (ref 4.6–6.2)
WBC # BLD AUTO: 6.86 K/UL (ref 3.9–12.7)

## 2019-12-03 PROCEDURE — 36415 COLL VENOUS BLD VENIPUNCTURE: CPT | Mod: PO

## 2019-12-03 PROCEDURE — 85025 COMPLETE CBC W/AUTO DIFF WBC: CPT

## 2020-01-02 RX ORDER — LEVOTHYROXINE SODIUM 75 UG/1
TABLET ORAL
Qty: 30 TABLET | Refills: 0 | Status: SHIPPED | OUTPATIENT
Start: 2020-01-02 | End: 2020-01-31

## 2020-01-16 ENCOUNTER — TELEPHONE (OUTPATIENT)
Dept: FAMILY MEDICINE | Facility: CLINIC | Age: 80
End: 2020-01-16

## 2020-01-16 RX ORDER — LISINOPRIL AND HYDROCHLOROTHIAZIDE 20; 25 MG/1; MG/1
1 TABLET ORAL 2 TIMES DAILY
Qty: 60 TABLET | Refills: 5 | Status: SHIPPED | OUTPATIENT
Start: 2020-01-16 | End: 2020-07-19

## 2020-01-16 RX ORDER — SIMVASTATIN 20 MG/1
20 TABLET, FILM COATED ORAL NIGHTLY
Qty: 30 TABLET | Refills: 5 | Status: SHIPPED | OUTPATIENT
Start: 2020-01-16 | End: 2020-07-14

## 2020-01-16 NOTE — TELEPHONE ENCOUNTER
Spoke with patient medication refills needed pended. Informed patient to check with pharmacy later on this afternoon.

## 2020-01-16 NOTE — TELEPHONE ENCOUNTER
----- Message from Yolande Muro sent at 1/16/2020 10:27 AM CST -----  Contact: [phone]  Would like to consult with the nurse, Patient thinks he had a Missed call concerning his Medication, Patient would like to speak with the nurse as soon as possible concerning this, Please call back at [phone], Thank desmond

## 2020-01-22 ENCOUNTER — OFFICE VISIT (OUTPATIENT)
Dept: FAMILY MEDICINE | Facility: CLINIC | Age: 80
End: 2020-01-22
Payer: MEDICARE

## 2020-01-22 ENCOUNTER — LAB VISIT (OUTPATIENT)
Dept: LAB | Facility: HOSPITAL | Age: 80
End: 2020-01-22
Attending: FAMILY MEDICINE
Payer: MEDICARE

## 2020-01-22 VITALS
HEIGHT: 72 IN | DIASTOLIC BLOOD PRESSURE: 70 MMHG | OXYGEN SATURATION: 98 % | BODY MASS INDEX: 28.13 KG/M2 | SYSTOLIC BLOOD PRESSURE: 148 MMHG | WEIGHT: 207.69 LBS | TEMPERATURE: 98 F | HEART RATE: 68 BPM

## 2020-01-22 DIAGNOSIS — E03.9 ACQUIRED HYPOTHYROIDISM: ICD-10-CM

## 2020-01-22 DIAGNOSIS — E87.6 POTASSIUM (K) DEFICIENCY: ICD-10-CM

## 2020-01-22 DIAGNOSIS — I69.30 HISTORY OF CVA WITH RESIDUAL DEFICIT: Chronic | ICD-10-CM

## 2020-01-22 DIAGNOSIS — Z01.00 DIABETIC EYE EXAM: ICD-10-CM

## 2020-01-22 DIAGNOSIS — E11.9 TYPE 2 DIABETES MELLITUS WITHOUT COMPLICATION, WITHOUT LONG-TERM CURRENT USE OF INSULIN: Chronic | ICD-10-CM

## 2020-01-22 DIAGNOSIS — E11.9 TYPE 2 DIABETES MELLITUS WITHOUT COMPLICATION, WITHOUT LONG-TERM CURRENT USE OF INSULIN: Primary | Chronic | ICD-10-CM

## 2020-01-22 DIAGNOSIS — I10 HYPERTENSION, UNSPECIFIED TYPE: ICD-10-CM

## 2020-01-22 DIAGNOSIS — E11.9 DIABETIC EYE EXAM: ICD-10-CM

## 2020-01-22 LAB
BASOPHILS # BLD AUTO: 0.07 K/UL (ref 0–0.2)
BASOPHILS NFR BLD: 1.2 % (ref 0–1.9)
DIFFERENTIAL METHOD: ABNORMAL
EOSINOPHIL # BLD AUTO: 0.1 K/UL (ref 0–0.5)
EOSINOPHIL NFR BLD: 0.9 % (ref 0–8)
ERYTHROCYTE [DISTWIDTH] IN BLOOD BY AUTOMATED COUNT: 12.7 % (ref 11.5–14.5)
HCT VFR BLD AUTO: 36.7 % (ref 40–54)
HGB BLD-MCNC: 11.9 G/DL (ref 14–18)
IMM GRANULOCYTES # BLD AUTO: 0.01 K/UL (ref 0–0.04)
IMM GRANULOCYTES NFR BLD AUTO: 0.2 % (ref 0–0.5)
LYMPHOCYTES # BLD AUTO: 1.1 K/UL (ref 1–4.8)
LYMPHOCYTES NFR BLD: 19.9 % (ref 18–48)
MCH RBC QN AUTO: 30.7 PG (ref 27–31)
MCHC RBC AUTO-ENTMCNC: 32.4 G/DL (ref 32–36)
MCV RBC AUTO: 95 FL (ref 82–98)
MONOCYTES # BLD AUTO: 0.4 K/UL (ref 0.3–1)
MONOCYTES NFR BLD: 6.3 % (ref 4–15)
NEUTROPHILS # BLD AUTO: 4.1 K/UL (ref 1.8–7.7)
NEUTROPHILS NFR BLD: 71.5 % (ref 38–73)
NRBC BLD-RTO: 0 /100 WBC
PLATELET # BLD AUTO: 232 K/UL (ref 150–350)
PMV BLD AUTO: 10.7 FL (ref 9.2–12.9)
RBC # BLD AUTO: 3.88 M/UL (ref 4.6–6.2)
TSH SERPL DL<=0.005 MIU/L-ACNC: 1.97 UIU/ML (ref 0.4–4)
WBC # BLD AUTO: 5.68 K/UL (ref 3.9–12.7)

## 2020-01-22 PROCEDURE — 99214 PR OFFICE/OUTPT VISIT, EST, LEVL IV, 30-39 MIN: ICD-10-PCS | Mod: 25,S$GLB,, | Performed by: FAMILY MEDICINE

## 2020-01-22 PROCEDURE — 1159F PR MEDICATION LIST DOCUMENTED IN MEDICAL RECORD: ICD-10-PCS | Mod: S$GLB,,, | Performed by: FAMILY MEDICINE

## 2020-01-22 PROCEDURE — 80053 COMPREHEN METABOLIC PANEL: CPT

## 2020-01-22 PROCEDURE — 3077F SYST BP >= 140 MM HG: CPT | Mod: CPTII,S$GLB,, | Performed by: FAMILY MEDICINE

## 2020-01-22 PROCEDURE — 99999 PR PBB SHADOW E&M-EST. PATIENT-LVL V: CPT | Mod: PBBFAC,,, | Performed by: FAMILY MEDICINE

## 2020-01-22 PROCEDURE — 83036 HEMOGLOBIN GLYCOSYLATED A1C: CPT

## 2020-01-22 PROCEDURE — 90471 TDAP VACCINE GREATER THAN OR EQUAL TO 7YO IM: ICD-10-PCS | Mod: S$GLB,,, | Performed by: FAMILY MEDICINE

## 2020-01-22 PROCEDURE — 1100F PR PT FALLS ASSESS DOC 2+ FALLS/FALL W/INJURY/YR: ICD-10-PCS | Mod: CPTII,S$GLB,, | Performed by: FAMILY MEDICINE

## 2020-01-22 PROCEDURE — 1100F PTFALLS ASSESS-DOCD GE2>/YR: CPT | Mod: CPTII,S$GLB,, | Performed by: FAMILY MEDICINE

## 2020-01-22 PROCEDURE — 90715 TDAP VACCINE GREATER THAN OR EQUAL TO 7YO IM: ICD-10-PCS | Mod: S$GLB,,, | Performed by: FAMILY MEDICINE

## 2020-01-22 PROCEDURE — 1126F PR PAIN SEVERITY QUANTIFIED, NO PAIN PRESENT: ICD-10-PCS | Mod: S$GLB,,, | Performed by: FAMILY MEDICINE

## 2020-01-22 PROCEDURE — 99214 OFFICE O/P EST MOD 30 MIN: CPT | Mod: 25,S$GLB,, | Performed by: FAMILY MEDICINE

## 2020-01-22 PROCEDURE — 3288F FALL RISK ASSESSMENT DOCD: CPT | Mod: CPTII,S$GLB,, | Performed by: FAMILY MEDICINE

## 2020-01-22 PROCEDURE — 85025 COMPLETE CBC W/AUTO DIFF WBC: CPT

## 2020-01-22 PROCEDURE — 3078F DIAST BP <80 MM HG: CPT | Mod: CPTII,S$GLB,, | Performed by: FAMILY MEDICINE

## 2020-01-22 PROCEDURE — 3078F PR MOST RECENT DIASTOLIC BLOOD PRESSURE < 80 MM HG: ICD-10-PCS | Mod: CPTII,S$GLB,, | Performed by: FAMILY MEDICINE

## 2020-01-22 PROCEDURE — 84443 ASSAY THYROID STIM HORMONE: CPT

## 2020-01-22 PROCEDURE — 1159F MED LIST DOCD IN RCRD: CPT | Mod: S$GLB,,, | Performed by: FAMILY MEDICINE

## 2020-01-22 PROCEDURE — 90471 IMMUNIZATION ADMIN: CPT | Mod: S$GLB,,, | Performed by: FAMILY MEDICINE

## 2020-01-22 PROCEDURE — 3288F PR FALLS RISK ASSESSMENT DOCUMENTED: ICD-10-PCS | Mod: CPTII,S$GLB,, | Performed by: FAMILY MEDICINE

## 2020-01-22 PROCEDURE — 36415 COLL VENOUS BLD VENIPUNCTURE: CPT | Mod: PO

## 2020-01-22 PROCEDURE — 80061 LIPID PANEL: CPT

## 2020-01-22 PROCEDURE — 99999 PR PBB SHADOW E&M-EST. PATIENT-LVL V: ICD-10-PCS | Mod: PBBFAC,,, | Performed by: FAMILY MEDICINE

## 2020-01-22 PROCEDURE — 90715 TDAP VACCINE 7 YRS/> IM: CPT | Mod: S$GLB,,, | Performed by: FAMILY MEDICINE

## 2020-01-22 PROCEDURE — 3077F PR MOST RECENT SYSTOLIC BLOOD PRESSURE >= 140 MM HG: ICD-10-PCS | Mod: CPTII,S$GLB,, | Performed by: FAMILY MEDICINE

## 2020-01-22 PROCEDURE — 1126F AMNT PAIN NOTED NONE PRSNT: CPT | Mod: S$GLB,,, | Performed by: FAMILY MEDICINE

## 2020-01-22 RX ORDER — INSULIN PUMP SYRINGE, 3 ML
EACH MISCELLANEOUS
Qty: 1 EACH | Refills: 1 | Status: SHIPPED | OUTPATIENT
Start: 2020-01-22 | End: 2020-02-25

## 2020-01-22 RX ORDER — LANCETS
1 EACH MISCELLANEOUS 3 TIMES DAILY
Qty: 100 EACH | Refills: 12 | Status: SHIPPED | OUTPATIENT
Start: 2020-01-22 | End: 2020-02-25

## 2020-01-22 NOTE — PROGRESS NOTES
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  He has been doing some physical therapy  He also has diabetes for few years he says diabetes been well controlled.      He has had prostate cancer currently on radiation therapy and now he is on hormone therapy.  Patient's oncologist is Dr. Fonseca and his urologist is Dr. Chakraborty    He complains of a lesion in the right ear he had a melanoma excision there.    He also sees a cardiologist and he has had issues with low potassium is taking potassium pills the diuretics that he is on were increased and ever since his potassium is been an issue.    Also has mild anemia which appears to be improving based on last labs.      ROS:  Review of Systems   Constitutional: Negative for activity change, chills, fatigue, fever and unexpected weight change.   HENT: Negative for congestion, ear discharge, ear pain, hearing loss, postnasal drip and rhinorrhea.    Eyes: Negative for pain and visual disturbance.   Respiratory: Negative for cough, chest tightness and shortness of breath.    Cardiovascular: Negative for chest pain and palpitations.   Gastrointestinal: Negative for abdominal pain, diarrhea and vomiting.   Endocrine: Negative for heat intolerance.   Genitourinary: Negative for dysuria, flank pain, frequency and hematuria.   Musculoskeletal: Negative for back pain, gait problem and neck pain.   Skin: Negative for color change and rash.   Neurological: Negative for dizziness, tremors, seizures, numbness and headaches.   Psychiatric/Behavioral: Negative for agitation, hallucinations, self-injury, sleep disturbance and suicidal ideas. The patient is not nervous/anxious.        Past Medical History:   Diagnosis Date    Cancer     prostate    Diabetes mellitus     Encounter for blood  transfusion     [place] (hard of hearing)     Hyperlipidemia     Hypertension     Stroke 2019    Thyroid disease        Social History:  Social History     Socioeconomic History    Marital status:      Spouse name: Not on file    Number of children: Not on file    Years of education: Not on file    Highest education level: Not on file   Occupational History    Not on file   Social Needs    Financial resource strain: Not on file    Food insecurity:     Worry: Not on file     Inability: Not on file    Transportation needs:     Medical: Not on file     Non-medical: Not on file   Tobacco Use    Smoking status: Never Smoker    Smokeless tobacco: Never Used   Substance and Sexual Activity    Alcohol use: Not Currently    Drug use: Never    Sexual activity: Not Currently   Lifestyle    Physical activity:     Days per week: Not on file     Minutes per session: Not on file    Stress: Not on file   Relationships    Social connections:     Talks on phone: Not on file     Gets together: Not on file     Attends Catholic service: Not on file     Active member of club or organization: Not on file     Attends meetings of clubs or organizations: Not on file     Relationship status: Not on file   Other Topics Concern    Not on file   Social History Narrative    Not on file       Family History:   family history includes Cancer in his mother; Stroke in his father and mother.    Health Maintenance   Topic Date Due    Foot Exam  09/07/1952    Eye Exam  09/07/1952    TETANUS VACCINE  09/07/1960    Pneumococcal Vaccine (65+ High/Highest Risk) (1 of 2 - PCV13) 09/07/2007    Hemoglobin A1c  02/29/2020    Lipid Panel  10/18/2020       Physical Exam:    Vital Signs  Temp: 97.7 °F (36.5 °C)  Temp src: Temporal  Pulse: 68  SpO2: 98 %  BP: (!) 148/70  BP Location: Left arm  Patient Position: Sitting  Pain Score: 0-No pain  Height and Weight  Height: 6' (182.9 cm)  Weight: 94.2 kg (207 lb 10.8 oz)  BSA  (Calculated - sq m): 2.19 sq meters  BMI (Calculated): 28.2  Weight in (lb) to have BMI = 25: 183.9]    Body mass index is 28.17 kg/m².    Physical Exam   Constitutional: He is oriented to person, place, and time. He appears well-developed.   HENT:   Mouth/Throat: Oropharynx is clear and moist.   The right earlobe has a thickening appears to be a growth of some type, there is similar growth on his back is could be callus   Eyes: Pupils are equal, round, and reactive to light. Conjunctivae are normal.   Neck: Normal range of motion. Neck supple.   Cardiovascular: Normal rate, regular rhythm and normal heart sounds.   No murmur heard.  Pulmonary/Chest: Effort normal and breath sounds normal. No respiratory distress. He has no wheezes. He has no rales. He exhibits no tenderness.   Abdominal: Soft. He exhibits no distension and no mass. There is no tenderness. There is no guarding.   Musculoskeletal: He exhibits no edema or tenderness.   Patient is wheelchair-bound, requires assistance to stand up   Lymphadenopathy:     He has no cervical adenopathy.   Neurological: He is alert and oriented to person, place, and time. He has normal reflexes.   Skin: Skin is warm and dry.   Psychiatric: He has a normal mood and affect. His behavior is normal. Judgment and thought content normal.         Assessment:      ICD-10-CM ICD-9-CM   1. Type 2 diabetes mellitus without complication, without long-term current use of insulin E11.9 250.00   2. Hypertension, unspecified type I10 401.9   3. History of CVA with residual deficit I69.30 438.9   4. Acquired hypothyroidism E03.9 244.9   5. Diabetic eye exam Z01.00 V72.0    E11.9 250.00         Plan:        will check labs as below  Blood pressure stable  Schedule a diabetic eye exam  Follow-up 3 months      Orders Placed This Encounter   Procedures    (In Office Administered) Tdap Vaccine    CBC auto differential    Comprehensive metabolic panel    Lipid panel    Hemoglobin A1c     Hemoglobin A1c    Comprehensive metabolic panel    CBC auto differential    Microalbumin/creatinine urine ratio    Lipid panel    TSH    Ambulatory referral to Optometry    Ambulatory referral to Optometry       Current Outpatient Medications   Medication Sig Dispense Refill    aspirin (ECOTRIN) 81 MG EC tablet Take 81 mg by mouth once daily.      furosemide (LASIX) 20 MG tablet Take 1 tablet (20 mg total) by mouth once daily. 30 tablet 3    levothyroxine (SYNTHROID) 75 MCG tablet TAKE ONE TABLET BY MOUTH EVERY DAY 30 tablet 0    lisinopril-hydrochlorothiazide (PRINZIDE,ZESTORETIC) 20-25 mg Tab Take 1 tablet by mouth 2 (two) times daily. 60 tablet 5    metFORMIN (GLUCOPHAGE) 500 MG tablet Take 1 tablet (500 mg total) by mouth 2 (two) times daily with meals. 60 tablet 6    metoprolol tartrate (LOPRESSOR) 25 MG tablet Take 1 tablet (25 mg total) by mouth 2 (two) times daily. 180 tablet 1    mirabegron (MYRBETRIQ) 50 mg Tb24 Take 1 tablet by mouth once daily.       potassium chloride SA (K-DUR,KLOR-CON) 20 MEQ tablet Take 3 tablets (60 mEq total) by mouth once daily. 90 tablet 3    simvastatin (ZOCOR) 20 MG tablet Take 1 tablet (20 mg total) by mouth every evening. 30 tablet 5    tamsulosin (FLOMAX) 0.4 mg Cap Take 0.4 mg by mouth 2 (two) times daily.       torsemide (DEMADEX) 10 MG Tab Take 1 tablet (10 mg total) by mouth once daily. 30 tablet 5    blood sugar diagnostic Strp 1 each by Misc.(Non-Drug; Combo Route) route 3 (three) times daily. 100 each 12    blood-glucose meter kit Use as instructed 1 each 1    lancets (ACCU-CHEK SOFTCLIX LANCETS) Misc 1 each by Misc.(Non-Drug; Combo Route) route 3 (three) times daily. 100 each 12     No current facility-administered medications for this visit.        There are no discontinued medications.    No follow-ups on file.      Cleve Nam MD

## 2020-01-23 LAB
ALBUMIN SERPL BCP-MCNC: 3.9 G/DL (ref 3.5–5.2)
ALP SERPL-CCNC: 55 U/L (ref 55–135)
ALT SERPL W/O P-5'-P-CCNC: 13 U/L (ref 10–44)
ANION GAP SERPL CALC-SCNC: 13 MMOL/L (ref 8–16)
ANION GAP SERPL CALC-SCNC: 13 MMOL/L (ref 8–16)
AST SERPL-CCNC: 13 U/L (ref 10–40)
BILIRUB SERPL-MCNC: 0.5 MG/DL (ref 0.1–1)
BUN SERPL-MCNC: 12 MG/DL (ref 8–23)
BUN SERPL-MCNC: 12 MG/DL (ref 8–23)
CALCIUM SERPL-MCNC: 9.3 MG/DL (ref 8.7–10.5)
CALCIUM SERPL-MCNC: 9.3 MG/DL (ref 8.7–10.5)
CHLORIDE SERPL-SCNC: 98 MMOL/L (ref 95–110)
CHLORIDE SERPL-SCNC: 98 MMOL/L (ref 95–110)
CHOLEST SERPL-MCNC: 157 MG/DL (ref 120–199)
CHOLEST/HDLC SERPL: 2.5 {RATIO} (ref 2–5)
CO2 SERPL-SCNC: 26 MMOL/L (ref 23–29)
CO2 SERPL-SCNC: 26 MMOL/L (ref 23–29)
CREAT SERPL-MCNC: 1 MG/DL (ref 0.5–1.4)
CREAT SERPL-MCNC: 1 MG/DL (ref 0.5–1.4)
EST. GFR  (AFRICAN AMERICAN): >60 ML/MIN/1.73 M^2
EST. GFR  (AFRICAN AMERICAN): >60 ML/MIN/1.73 M^2
EST. GFR  (NON AFRICAN AMERICAN): >60 ML/MIN/1.73 M^2
EST. GFR  (NON AFRICAN AMERICAN): >60 ML/MIN/1.73 M^2
ESTIMATED AVG GLUCOSE: 140 MG/DL (ref 68–131)
GLUCOSE SERPL-MCNC: 121 MG/DL (ref 70–110)
GLUCOSE SERPL-MCNC: 121 MG/DL (ref 70–110)
HBA1C MFR BLD HPLC: 6.5 % (ref 4–5.6)
HDLC SERPL-MCNC: 64 MG/DL (ref 40–75)
HDLC SERPL: 40.8 % (ref 20–50)
LDLC SERPL CALC-MCNC: 78.4 MG/DL (ref 63–159)
NONHDLC SERPL-MCNC: 93 MG/DL
POTASSIUM SERPL-SCNC: 3.5 MMOL/L (ref 3.5–5.1)
POTASSIUM SERPL-SCNC: 3.5 MMOL/L (ref 3.5–5.1)
PROT SERPL-MCNC: 6.2 G/DL (ref 6–8.4)
SODIUM SERPL-SCNC: 137 MMOL/L (ref 136–145)
SODIUM SERPL-SCNC: 137 MMOL/L (ref 136–145)
TRIGL SERPL-MCNC: 73 MG/DL (ref 30–150)

## 2020-01-24 ENCOUNTER — TELEPHONE (OUTPATIENT)
Dept: FAMILY MEDICINE | Facility: CLINIC | Age: 80
End: 2020-01-24

## 2020-01-24 DIAGNOSIS — D64.9 CHRONIC ANEMIA: Primary | ICD-10-CM

## 2020-01-28 RX ORDER — FUROSEMIDE 20 MG/1
TABLET ORAL
Qty: 90 TABLET | Refills: 1 | Status: SHIPPED | OUTPATIENT
Start: 2020-01-28

## 2020-01-31 RX ORDER — LEVOTHYROXINE SODIUM 75 UG/1
TABLET ORAL
Qty: 30 TABLET | Refills: 0 | Status: SHIPPED | OUTPATIENT
Start: 2020-01-31 | End: 2020-02-03

## 2020-02-03 ENCOUNTER — OFFICE VISIT (OUTPATIENT)
Dept: OPHTHALMOLOGY | Facility: CLINIC | Age: 80
End: 2020-02-03
Payer: MEDICARE

## 2020-02-03 DIAGNOSIS — H52.4 BILATERAL PRESBYOPIA: ICD-10-CM

## 2020-02-03 DIAGNOSIS — H53.40 VISUAL FIELD DEFECT OF LEFT EYE: ICD-10-CM

## 2020-02-03 DIAGNOSIS — H35.62 RETINAL HEMORRHAGE, LEFT EYE: Primary | ICD-10-CM

## 2020-02-03 DIAGNOSIS — H50.112 EXOTROPIA OF LEFT EYE: ICD-10-CM

## 2020-02-03 PROCEDURE — 92004 PR EYE EXAM, NEW PATIENT,COMPREHESV: ICD-10-PCS | Mod: S$GLB,,, | Performed by: OPTOMETRIST

## 2020-02-03 PROCEDURE — 92015 PR REFRACTION: ICD-10-PCS | Mod: S$GLB,,, | Performed by: OPTOMETRIST

## 2020-02-03 PROCEDURE — 92004 COMPRE OPH EXAM NEW PT 1/>: CPT | Mod: S$GLB,,, | Performed by: OPTOMETRIST

## 2020-02-03 PROCEDURE — 99999 PR PBB SHADOW E&M-EST. PATIENT-LVL I: ICD-10-PCS | Mod: PBBFAC,,, | Performed by: OPTOMETRIST

## 2020-02-03 PROCEDURE — 99999 PR PBB SHADOW E&M-EST. PATIENT-LVL I: CPT | Mod: PBBFAC,,, | Performed by: OPTOMETRIST

## 2020-02-03 PROCEDURE — 92015 DETERMINE REFRACTIVE STATE: CPT | Mod: S$GLB,,, | Performed by: OPTOMETRIST

## 2020-02-03 RX ORDER — LEVOTHYROXINE SODIUM 75 UG/1
TABLET ORAL
Qty: 90 TABLET | Refills: 0 | Status: SHIPPED | OUTPATIENT
Start: 2020-02-03 | End: 2020-06-11 | Stop reason: SDUPTHER

## 2020-02-03 NOTE — LETTER
February 3, 2020      Cleve Nam MD  [address] [address] [address]           [facility]  [address] [place] [address]  Phone: [phone]  Fax: [phone]          Patient: Scot Penaloza   MR Number: [id number]   YOB: 1940   Date of Visit: 2/3/2020       Dear Dr. Cleve Nam:    Thank you for referring Scot Penaloza to me for evaluation. Attached you will find relevant portions of my assessment and plan of care.    If you have questions, please do not hesitate to call me. I look forward to following Scot Penaloza along with you.    Sincerely,    Kareem Cowart, OD    Enclosure  CC:  No Recipients    If you would like to receive this communication electronically, please contact [email or web address] or [phone] to request more information on [facility] Link access.    For providers and/or their staff who would like to refer a patient to [facility], please contact us through our one-stop-shop provider referral line, Layne Cook, at [phone].    If you feel you have received this communication in error or would no longer like to receive these types of communications, please e-mail [email or web address]          PROBLEM LIST:     Malignant neoplasm of upper-outer quadrant of left breast in female, estrogen receptor negative (CMS/HCC)    1/15/2019 Initial Diagnosis     Malignant neoplasm of upper-outer quadrant of left breast in female, estrogen receptor negative (CMS/HCC)  Abnormal mammogram and a biopsy-proven poorly differentiated infiltrating ductal carcinoma in the left 3 o'clock position measuring 6 mm.                2/8/2019 Surgery     Surgery  Lumpectomy by Dr. PATINO on 2/8/2019.   Grade 3, high grade 6 mm tumor with margins negative by 2 mm.    Tumor was ER/CA-negative HER-2/jakob negative and 0 of 1 lymph node was positive.                 3/5/2019 Imaging     CT and bone scan scheduled 03/05/2019         3/5/2019 -  Chemotherapy     OP BREAST TC DOCEtaxel / Cyclophosphamide  Start date 03/07/2019 pending.   Education 03/05/2019            REASON FOR VISIT: Triple Negative Breast cancer    HISTORY OF PRESENT ILLNESS:   68 y.o.  female presents today for management of triple negative breast cancer.  She has tolerated treatment with side effects.  She had some nausea that she managed with Zofran.  She had some diarrhea that lasted about 2 days and had 3-4 stools a day.  She describes fatigue and a fog that last a few days also.  Her blood sugar was very high for the first week and she tried increasing her insulin but her PCP recently started her on lantus to help manage.  During the first week she also had numbness and tinging in her hands and feet.  She was not sure if this was related to her blood sugar or chemotherapy.  It has resolved and is back to normal. The second week she noticed an improvement in all her symptoms.  She had a UTI a couple of weeks ago, but has been doing better since completing antibiotic.     Past medical history, social history and family history was reviewed and unchanged from prior visit.    Review of Systems:    Review of Systems - Oncology   A comprehensive 14 point review of systems was  "performed and was negative except as mentioned.      Medications:  The current medication list was reviewed in the EMR    ALLERGIES:    Allergies   Allergen Reactions   • Iodine Anaphylaxis     PT STATES SHE 'PASSED OUT ' AFTER CT CONTRAST.          Physical Exam    VITAL SIGNS:  /82   Pulse 79   Temp 97.9 °F (36.6 °C) (Temporal)   Resp 18   Ht 157.5 cm (62\")   Wt 92.5 kg (204 lb)   BMI 37.31 kg/m²     Wt Readings from Last 3 Encounters:   03/28/19 92.5 kg (204 lb)   03/07/19 91.6 kg (202 lb)   03/05/19 93.4 kg (206 lb)        Performance Status: 1    General: well appearing, in no acute distress  HEENT: sclera anicteric, oropharynx clear, neck is supple  Lymphatics: no cervical, supraclavicular, or axillary adenopathy  Cardiovascular: regular rate and rhythm, no murmurs, rubs or gallops  Lungs: clear to auscultation bilaterally  Abdomen: soft, nontender, nondistended.  No palpable organomegaly  Extremities: no lower extremity edema  Skin: no rashes, lesions, bruising, or petechiae  Msk:  Shows no weakness of the large muscle groups  Psych: Mood is stable        RECENT LABS:    Lab Results   Component Value Date    HGB 14.2 03/07/2019    HCT 42.3 03/07/2019    MCV 85.8 03/07/2019     03/07/2019    WBC 7.62 03/07/2019    NEUTROABS 6.70 03/07/2019    LYMPHSABS 0.76 03/07/2019    MONOSABS 0.07 03/07/2019    EOSABS 0.00 03/07/2019    BASOSABS 0.03 03/07/2019       Lab Results   Component Value Date    GLUCOSE 313 (H) 03/07/2019    BUN 13 03/07/2019    CREATININE 0.60 03/07/2019     03/07/2019    K 3.9 03/07/2019     03/07/2019    CO2 25.0 (L) 03/07/2019    CALCIUM 9.8 03/07/2019    PROTEINTOT 7.7 03/07/2019    ALBUMIN 4.30 03/07/2019    BILITOT 1.3 03/07/2019    ALKPHOS 130 (H) 03/07/2019    AST 28 03/07/2019    ALT 30 03/07/2019       Ct Abdomen Pelvis Without Contrast    Result Date: 3/5/2019  No evidence of metastatic disease involving the chest, abdomen or pelvis.  This study was " performed with techniques to keep radiation doses as low as reasonably achievable (ALARA). Individualized dose reduction techniques using automated exposure control or adjustment of mA and/or kV according to the patient size were employed.  This report was finalized on 3/5/2019 2:45 PM by Dariel Lan M.D..    Ct Chest Without Contrast    Result Date: 3/5/2019  No evidence of metastatic disease involving the chest, abdomen or pelvis.  This study was performed with techniques to keep radiation doses as low as reasonably achievable (ALARA). Individualized dose reduction techniques using automated exposure control or adjustment of mA and/or kV according to the patient size were employed.  This report was finalized on 3/5/2019 2:45 PM by Dariel Lan M.D..    Nm Bone Scan Whole Body    Result Date: 3/5/2019  No scintigraphic evidence of metastatic disease.   This report was finalized on 3/5/2019 4:11 PM by Dariel Lan M.D..    Mri Breast Bilateral Diagnostic With & Without Contrast    Result Date: 1/8/2019  1. Biopsy-proven 0.5-0.6 cm invasive ductal carcinoma in the far posterior left 3:00 region. There is no evidence of multifocal/multicentric disease in the left breast. 2. Negative right breast MRI. 3. No MRI evidence of lymphadenopathy.  RECOMMENDATIONS: Surgical follow-up with Dr. Angel.   BI-RADS CATEGORY: 6, KNOWN BIOPSY PROVEN MALIGNANCY.  FINAL MRI RESULTS AND RECOMMENDATIONS WILL BE CALLED TO THE PATIENT BY OUR LEAD BREAST MRI TECHNOLOGIST.  This report was finalized on 1/8/2019 4:37 PM by Dr. Patti Quinonez MD.      Nm Burns Node Injection Only    Result Date: 2/8/2019  Radiopharmaceutical filtered sulfur colloid used for the injection for left breast lymphoscintigraphy and sentinel node mapping.  DICTATED:   2/8/2019 EDITED/ls :   2/8/2019    This report was finalized on 2/8/2019 4:42 PM by Dr. Fish Diaz.            Assessment/Plan  1. Stage IB triple negative ductal carcinoma  of the left breast status post lumpectomy.  She has completed once cycle of Taxotere and Cytoxan as adjuvant therapy. We will continue with Cycle #2 today along with neulasta.     2. Peripheral neuropathy is Grade 1-2.  We will continue to monitor.  She has no current symptoms. We discussed that she should notify us if neuropathy persists. We will consider dose reducing Taxotere at that time.     3. Chemotherapy related nausea: We will continue Zofran.  We discussed if nausea persists despite Zofran she should contact the office and we will send in another prescription. She will notify the clinic if she goes more that 24 hrs without eating or drinking. We reviewed that she should drink at least 2-3 quarts of fluids per day to maintain hydration.    4. Chemotherapy related diarrhea: We discussed that she should take imodium if she has 4-6 bowel movements in a 24 hours period.  The patient will notify us if she has further concerns.          I spent a total of 25 minutes in direct patient care, greater than 15 minutes (greater than 50%) were spent in coordination of care, and counseling the patient regarding  treatment, side effects, and management of breast cancer . Answered any questions patient had with medication and plan.      Mila Pelayo APRELIESER  [facility]    Return on: 04/18/19    Orders Placed This Encounter   Procedures   • Comprehensive metabolic panel   • CBC and Differential       3/28/2019         Please note that portions of this note may have been completed with a voice recognition program. Efforts were made to edit the dictations, but occasionally words are mistranscribed.

## 2020-02-03 NOTE — PROGRESS NOTES
HPI     NIDDM exam.  Patient states 90% of vision in left eye is gone due to stroke in left eye     Patient had a stroke 02/2019  Last eye exam several years  New patient to [facility].  Update glasses RX.  Lab Results       Component                Value               Date                       HGBA1C                   6.5 (H)             01/22/2020       Last edited by Kareem Cowart, JOSE on 2/3/2020 11:45 AM. (History)            Assessment /Plan     For exam results, see Encounter Report.    Retinal hemorrhage, left eye    Exotropia of left eye    Visual field defect of left eye    Bilateral presbyopia      Was treated by [facility] last year for retinal heme. Will request records.    History of corrective EOM surgery at age 6, became alternating exotrope.  Retinal heme last year caused VF changes.    Dispense Final Rx for glasses or may use OTC glasses.  RTC 1 month Dr Henao  Discussed above and answered questions.

## 2020-02-25 ENCOUNTER — LAB VISIT (OUTPATIENT)
Dept: LAB | Facility: HOSPITAL | Age: 80
End: 2020-02-25
Attending: INTERNAL MEDICINE
Payer: MEDICARE

## 2020-02-25 ENCOUNTER — INITIAL CONSULT (OUTPATIENT)
Dept: HEMATOLOGY/ONCOLOGY | Facility: CLINIC | Age: 80
End: 2020-02-25
Payer: MEDICARE

## 2020-02-25 ENCOUNTER — TELEPHONE (OUTPATIENT)
Dept: HEMATOLOGY/ONCOLOGY | Facility: CLINIC | Age: 80
End: 2020-02-25

## 2020-02-25 VITALS
HEART RATE: 72 BPM | WEIGHT: 203.94 LBS | DIASTOLIC BLOOD PRESSURE: 89 MMHG | OXYGEN SATURATION: 98 % | HEIGHT: 72 IN | TEMPERATURE: 97 F | SYSTOLIC BLOOD PRESSURE: 177 MMHG | RESPIRATION RATE: 18 BRPM | BODY MASS INDEX: 27.62 KG/M2

## 2020-02-25 DIAGNOSIS — R20.9 UNSPECIFIED DISTURBANCES OF SKIN SENSATION: ICD-10-CM

## 2020-02-25 DIAGNOSIS — D64.9 NORMOCYTIC ANEMIA: ICD-10-CM

## 2020-02-25 DIAGNOSIS — G62.9 PERIPHERAL POLYNEUROPATHY: ICD-10-CM

## 2020-02-25 DIAGNOSIS — D64.9 NORMOCYTIC ANEMIA: Primary | ICD-10-CM

## 2020-02-25 LAB
FERRITIN SERPL-MCNC: 89 NG/ML (ref 20–300)
IRON SERPL-MCNC: 64 UG/DL (ref 45–160)
SATURATED IRON: 18 % (ref 20–50)
TOTAL IRON BINDING CAPACITY: 355 UG/DL (ref 250–450)
TRANSFERRIN SERPL-MCNC: 240 MG/DL (ref 200–375)
VIT B12 SERPL-MCNC: 534 PG/ML (ref 210–950)

## 2020-02-25 PROCEDURE — 3077F PR MOST RECENT SYSTOLIC BLOOD PRESSURE >= 140 MM HG: ICD-10-PCS | Mod: CPTII,S$GLB,, | Performed by: INTERNAL MEDICINE

## 2020-02-25 PROCEDURE — 3079F PR MOST RECENT DIASTOLIC BLOOD PRESSURE 80-89 MM HG: ICD-10-PCS | Mod: CPTII,S$GLB,, | Performed by: INTERNAL MEDICINE

## 2020-02-25 PROCEDURE — 1159F MED LIST DOCD IN RCRD: CPT | Mod: S$GLB,,, | Performed by: INTERNAL MEDICINE

## 2020-02-25 PROCEDURE — 82607 VITAMIN B-12: CPT

## 2020-02-25 PROCEDURE — 1126F AMNT PAIN NOTED NONE PRSNT: CPT | Mod: S$GLB,,, | Performed by: INTERNAL MEDICINE

## 2020-02-25 PROCEDURE — 1101F PT FALLS ASSESS-DOCD LE1/YR: CPT | Mod: CPTII,S$GLB,, | Performed by: INTERNAL MEDICINE

## 2020-02-25 PROCEDURE — 1126F PR PAIN SEVERITY QUANTIFIED, NO PAIN PRESENT: ICD-10-PCS | Mod: S$GLB,,, | Performed by: INTERNAL MEDICINE

## 2020-02-25 PROCEDURE — 82728 ASSAY OF FERRITIN: CPT

## 2020-02-25 PROCEDURE — 36415 COLL VENOUS BLD VENIPUNCTURE: CPT

## 2020-02-25 PROCEDURE — 1159F PR MEDICATION LIST DOCUMENTED IN MEDICAL RECORD: ICD-10-PCS | Mod: S$GLB,,, | Performed by: INTERNAL MEDICINE

## 2020-02-25 PROCEDURE — 83540 ASSAY OF IRON: CPT

## 2020-02-25 PROCEDURE — 1101F PR PT FALLS ASSESS DOC 0-1 FALLS W/OUT INJ PAST YR: ICD-10-PCS | Mod: CPTII,S$GLB,, | Performed by: INTERNAL MEDICINE

## 2020-02-25 PROCEDURE — 3079F DIAST BP 80-89 MM HG: CPT | Mod: CPTII,S$GLB,, | Performed by: INTERNAL MEDICINE

## 2020-02-25 PROCEDURE — 99204 OFFICE O/P NEW MOD 45 MIN: CPT | Mod: S$GLB,,, | Performed by: INTERNAL MEDICINE

## 2020-02-25 PROCEDURE — 99999 PR PBB SHADOW E&M-EST. PATIENT-LVL IV: CPT | Mod: PBBFAC,,, | Performed by: INTERNAL MEDICINE

## 2020-02-25 PROCEDURE — 99204 PR OFFICE/OUTPT VISIT, NEW, LEVL IV, 45-59 MIN: ICD-10-PCS | Mod: S$GLB,,, | Performed by: INTERNAL MEDICINE

## 2020-02-25 PROCEDURE — 99999 PR PBB SHADOW E&M-EST. PATIENT-LVL IV: ICD-10-PCS | Mod: PBBFAC,,, | Performed by: INTERNAL MEDICINE

## 2020-02-25 PROCEDURE — 3077F SYST BP >= 140 MM HG: CPT | Mod: CPTII,S$GLB,, | Performed by: INTERNAL MEDICINE

## 2020-02-25 RX ORDER — BLOOD-GLUCOSE METER
EACH MISCELLANEOUS
COMMUNITY
Start: 2020-01-22

## 2020-02-25 RX ORDER — LANCETS 33 GAUGE
EACH MISCELLANEOUS
COMMUNITY
Start: 2020-01-22

## 2020-02-25 NOTE — TELEPHONE ENCOUNTER
----- Message from Jeana Eldridge sent at 2/25/2020  2:28 PM CST -----  Contact: Pt  Pt is calling staff regarding the pt is on there way for today's appt    Pt call back [phone]    thanks

## 2020-02-25 NOTE — LETTER
February 25, 2020      Cleve Nam MD  [address] [address] [address]            [facility]  [address] [place] [address]  Phone: [phone]  Fax: [phone]          Patient: Scot Penaloza   MR Number: [id number]   YOB: 1940   Date of Visit: 2/25/2020       Dear Dr. Cleve Nam:    Thank you for referring Scot Penaloza to me for evaluation. Attached you will find relevant portions of my assessment and plan of care.    If you have questions, please do not hesitate to call me. I look forward to following Scot Penaloza along with you.    Sincerely,    Aleyda Anderson MD    Enclosure  CC:  No Recipients    If you would like to receive this communication electronically, please contact [email or web address] or [phone] to request more information on [facility] Link access.    For providers and/or their staff who would like to refer a patient to [facility], please contact us through our one-stop-shop provider referral line, Layne Cook, at [phone].    If you feel you have received this communication in error or would no longer like to receive these types of communications, please e-mail [email or web address]

## 2020-02-25 NOTE — PROGRESS NOTES
Subjective:      DATE OF VISIT: 2/25/20     ?  Patient ID:?Scot Penaloza is a 79 y.o. male.?? MR#: [id number]   ?   REFERRING PROVIDER: Cleve Nam MD  [address] [address] [address]     ? Primary Care Providers:  Michael Zhao MD, MD (General)     CHIEF COMPLAINT: ?  Anemia??   ?   HPI    Mr. 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 Fonseca about reach general in 2018 and ADT completed in 2019.  He notes being in remission being followed by outside providers with PSA.  In January 2020 he had influenza but has since recovered to baseline.  He does have baseline diarrhea.  He denies any melena, hematochezia or other bleeding.  He did have colonoscopy about 5 years ago and may be due for repeat (performed on outside and we do not have these records).  He does have neuropathy in lower extremities for several years.  He does not follow any restrictions in his diet and no history of bowel surgery.    Review of Systems    ?   A comprehensive 14-point review of systems was reviewed with patient and was negative other than as specified above.   ?   PAST MEDICAL HISTORY:   Past Medical History:   Diagnosis Date    Cancer     prostate    Diabetes mellitus 2010    BS didn't check 02/03/2020    Encounter for blood transfusion     [place] (hard of hearing)     Hyperlipidemia     Hypertension     Stroke 2019    Thyroid disease     ?     PAST SURGICAL HISTORY:   Past Surgical History:   Procedure Laterality Date    CATARACT EXTRACTION      COLONOSCOPY      INGUINAL HERNIA REPAIR        ?   ALLERGIES:   Allergies as of 02/25/2020 - Reviewed 02/25/2020   Allergen Reaction Noted    Sulfa (sulfonamide antibiotics) Rash 08/29/2019      ?   MEDICATIONS:?   Outpatient Medications Marked as Taking for the 2/25/20 encounter (Initial consult) with Aleyda  HEIDI Anderson MD   Medication Sig Dispense Refill    aspirin (ECOTRIN) 81 MG EC tablet Take 81 mg by mouth once daily.      blood sugar diagnostic Strp 1 each by Misc.(Non-Drug; Combo Route) route 3 (three) times daily. 100 each 12    furosemide (LASIX) 20 MG tablet TAKE 1 TABLET BY MOUTH EVERY DAY 90 tablet 1    levothyroxine (SYNTHROID) 75 MCG tablet TAKE 1 TABLET BY MOUTH EVERY DAY 90 tablet 0    lisinopril-hydrochlorothiazide (PRINZIDE,ZESTORETIC) 20-25 mg Tab Take 1 tablet by mouth 2 (two) times daily. 60 tablet 5    metFORMIN (GLUCOPHAGE) 500 MG tablet Take 1 tablet (500 mg total) by mouth 2 (two) times daily with meals. 60 tablet 6    metoprolol tartrate (LOPRESSOR) 25 MG tablet Take 1 tablet (25 mg total) by mouth 2 (two) times daily. 180 tablet 1    mirabegron (MYRBETRIQ) 50 mg Tb24 Take 1 tablet by mouth once daily.       potassium chloride SA (K-DUR,KLOR-CON) 20 MEQ tablet Take 3 tablets (60 mEq total) by mouth once daily. 90 tablet 3    simvastatin (ZOCOR) 20 MG tablet Take 1 tablet (20 mg total) by mouth every evening. 30 tablet 5    tamsulosin (FLOMAX) 0.4 mg Cap Take 0.4 mg by mouth 2 (two) times daily.       torsemide (DEMADEX) 10 MG Tab Take 1 tablet (10 mg total) by mouth once daily. 30 tablet 5    TRUE METRIX GLUCOSE METER Misc USE UTD      TRUEPLUS LANCETS 33 gauge Misc USE TID UTD        ?   SOCIAL HISTORY:?   Social History     Tobacco Use    Smoking status: Never Smoker    Smokeless tobacco: Never Used   Substance Use Topics    Alcohol use: Not Currently      ?      ?   FAMILY HISTORY:   family history includes Cancer in his mother; Hypertension in his child; Stroke in his father and mother.   ?        Objective:      Physical Exam      ?   Vitals:    02/25/20 1456   BP: (!) 177/89   Pulse: 72   Resp: 18   Temp: 97.3 °F (36.3 °C)      ?   ECOG:?0   General appearance: Generally well appearing, in no acute distress.   Head, eyes, ears, nose, and throat:  Left eye closure and mild  droop.  Oropharynx clear with moist mucous membranes.   Cardiovascular: Regular rate and rhythm, S1, S2, no audible murmurs.   Respiratory: Lungs clear to auscultation bilaterally.   Abdomen: Bowel sounds present, soft, nontender, nondistended.   Extremities: Warm, without edema.   Neurologic: Alert and oriented. Grossly normal strength, coordination, and gait slowed, left lower extremity weakness   Skin: No rashes, ecchymoses or petechial lesion.   ?   ?   Laboratory:  ?   No visits with results within 1 Day(s) from this visit.   Latest known visit with results is:   Lab Visit on 01/22/2020   Component Date Value Ref Range Status    Sodium 01/22/2020 137  136 - 145 mmol/L Final    Potassium 01/22/2020 3.5  3.5 - 5.1 mmol/L Final    Chloride 01/22/2020 98  95 - 110 mmol/L Final    CO2 01/22/2020 26  23 - 29 mmol/L Final    Glucose 01/22/2020 121* 70 - 110 mg/dL Final    BUN, Bld 01/22/2020 12  8 - 23 mg/dL Final    Creatinine 01/22/2020 1.0  0.5 - 1.4 mg/dL Final    Calcium 01/22/2020 9.3  8.7 - 10.5 mg/dL Final    Anion Gap 01/22/2020 13  8 - 16 mmol/L Final    eGFR if African American 01/22/2020 >60.0  >60 mL/min/1.73 m^2 Final    eGFR if non African American 01/22/2020 >60.0  >60 mL/min/1.73 m^2 Final    WBC 01/22/2020 5.68  3.90 - 12.70 K/uL Final    RBC 01/22/2020 3.88* 4.60 - 6.20 M/uL Final    Hemoglobin 01/22/2020 11.9* 14.0 - 18.0 g/dL Final    Hematocrit 01/22/2020 36.7* 40.0 - 54.0 % Final    Mean Corpuscular Volume 01/22/2020 95  82 - 98 fL Final    Mean Corpuscular Hemoglobin 01/22/2020 30.7  27.0 - 31.0 pg Final    Mean Corpuscular Hemoglobin Conc 01/22/2020 32.4  32.0 - 36.0 g/dL Final    RDW 01/22/2020 12.7  11.5 - 14.5 % Final    Platelets 01/22/2020 232  150 - 350 K/uL Final    MPV 01/22/2020 10.7  9.2 - 12.9 fL Final    Immature Granulocytes 01/22/2020 0.2  0.0 - 0.5 % Final    Gran # (ANC) 01/22/2020 4.1  1.8 - 7.7 K/uL Final    Immature Grans (Abs) 01/22/2020 0.01  0.00 -  0.04 K/uL Final    Lymph # 01/22/2020 1.1  1.0 - 4.8 K/uL Final    Mono # 01/22/2020 0.4  0.3 - 1.0 K/uL Final    Eos # 01/22/2020 0.1  0.0 - 0.5 K/uL Final    Baso # 01/22/2020 0.07  0.00 - 0.20 K/uL Final    nRBC 01/22/2020 0  0 /100 WBC Final    Gran% 01/22/2020 71.5  38.0 - 73.0 % Final    Lymph% 01/22/2020 19.9  18.0 - 48.0 % Final    Mono% 01/22/2020 6.3  4.0 - 15.0 % Final    Eosinophil% 01/22/2020 0.9  0.0 - 8.0 % Final    Basophil% 01/22/2020 1.2  0.0 - 1.9 % Final    Differential Method 01/22/2020 Automated   Final    Sodium 01/22/2020 137  136 - 145 mmol/L Final    Potassium 01/22/2020 3.5  3.5 - 5.1 mmol/L Final    Chloride 01/22/2020 98  95 - 110 mmol/L Final    CO2 01/22/2020 26  23 - 29 mmol/L Final    Glucose 01/22/2020 121* 70 - 110 mg/dL Final    BUN, Bld 01/22/2020 12  8 - 23 mg/dL Final    Creatinine 01/22/2020 1.0  0.5 - 1.4 mg/dL Final    Calcium 01/22/2020 9.3  8.7 - 10.5 mg/dL Final    Total Protein 01/22/2020 6.2  6.0 - 8.4 g/dL Final    Albumin 01/22/2020 3.9  3.5 - 5.2 g/dL Final    Total Bilirubin 01/22/2020 0.5  0.1 - 1.0 mg/dL Final    Alkaline Phosphatase 01/22/2020 55  55 - 135 U/L Final    AST 01/22/2020 13  10 - 40 U/L Final    ALT 01/22/2020 13  10 - 44 U/L Final    Anion Gap 01/22/2020 13  8 - 16 mmol/L Final    eGFR if African American 01/22/2020 >60.0  >60 mL/min/1.73 m^2 Final    eGFR if non African American 01/22/2020 >60.0  >60 mL/min/1.73 m^2 Final    Cholesterol 01/22/2020 157  120 - 199 mg/dL Final    Triglycerides 01/22/2020 73  30 - 150 mg/dL Final    HDL 01/22/2020 64  40 - 75 mg/dL Final    LDL Cholesterol 01/22/2020 78.4  63.0 - 159.0 mg/dL Final    Hdl/Cholesterol Ratio 01/22/2020 40.8  20.0 - 50.0 % Final    Total Cholesterol/HDL Ratio 01/22/2020 2.5  2.0 - 5.0 Final    Non-HDL Cholesterol 01/22/2020 93  mg/dL Final    Hemoglobin A1C 01/22/2020 6.5* 4.0 - 5.6 % Final    Estimated Avg Glucose 01/22/2020 140* 68 - 131 mg/dL Final     TSH 01/22/2020 1.965  0.400 - 4.000 uIU/mL Final          ?   Assessment/Plan:       1. Normocytic anemia    2. Peripheral polyneuropathy    3. Unspecified disturbances of skin sensation           Plan:     # normocytic anemia:  Labs available only since August 2018 with relatively stable mild normocytic anemia with hemoglobin around 12.  No evidence of bleeding and he notes being up-to-date on colonoscopy but may be close to do on repeat.  He does have neuropathy which may be secondary to type 2 diabetes; follows normal diet and would not expect vitamin B12 deficiency but can check for this in addition to iron deficiency.  I recommended he follow up with GI with recommended surveillance as felt indicated with his age and certainly if iron deficiency is discovered on labs today would recommend more urgent follow-up.  He does have several other comorbidities with CVA and may have anemia of chronic illness.  No renal disease.    # prostate cancer history:  Per patient report status post radiation therapy with Dr. Fonseca at [facility].  Recommend follow-up with his outside providers for surveillance of prostate cancer.      Follow-Up:   - labs today  - RV pending lab work    Addendum:  Labs returned showing normal vitamin B12 and folate levels.  Iron indices do suggest a mild iron deficiency.  Recommend that he take oral iron supplementation ferrous sulfate 324 mg, 65 mg elemental iron, 3 times daily.  I would like him to start taking this in follow-up in 3 months to repeat labs and assess for response.  Please let him know that oral iron can cause constipation recommend he use stool softeners as needed.

## 2020-02-26 ENCOUNTER — TELEPHONE (OUTPATIENT)
Dept: HEMATOLOGY/ONCOLOGY | Facility: CLINIC | Age: 80
End: 2020-02-26

## 2020-02-26 NOTE — TELEPHONE ENCOUNTER
----- Message from Alejandro Coombs sent at 2/26/2020  8:24 AM CST -----  Contact: Pt/Daughter (Marjorie)  .Type:  Patient Returning Call    Who Called: pt/daughter  Who Left Message for Patient: Nurse   Does the patient know what this is regarding?: return call   Would the patient rather a call back or a response via [facility]? Call back   Best Call Back Number: [phone]  Additional Information:

## 2020-02-27 ENCOUNTER — TELEPHONE (OUTPATIENT)
Dept: HEMATOLOGY/ONCOLOGY | Facility: CLINIC | Age: 80
End: 2020-02-27

## 2020-02-27 NOTE — TELEPHONE ENCOUNTER
Patient notified of lab results and to start ferrous sulfate TID.  Instructed patient may have constipation to increase fluid intake and take with orange juice to aid with absorption, verbalized understanding.

## 2020-02-27 NOTE — TELEPHONE ENCOUNTER
----- Message from Aleyda Anderson MD sent at 2/27/2020  3:18 PM CST -----  Contact: Patient  Please call patient back, I had told June to call with the following the other day and I see message that she relayed to daughter? Perhaps she did not relay message to patient. Please call to let him know the following:    Addendum:  Labs returned showing normal vitamin B12 and folate levels.  Iron indices do suggest a mild iron deficiency.  Recommend that he take oral iron supplementation ferrous sulfate 324 mg, 65 mg elemental iron, 3 times daily.  I would like him to start taking this in follow-up in 3 months to repeat labs and assess for response.  Please let him know that oral iron can cause constipation recommend he use stool softeners as needed.    ----- Message -----  From: Kala Batres LPN  Sent: 2/27/2020   2:31 PM CST  To: Aleyda Anderson MD    Patient call would like lab results.     ----- Message -----  From: [place] Weeks  Sent: 2/27/2020   1:58 PM CST  To: Justin JOSHI Staff    Patient is returning your call  Please be advised    Patient can be reached at    [phone]

## 2020-02-27 NOTE — TELEPHONE ENCOUNTER
----- Message from [place] Weeks sent at 2/27/2020  1:58 PM CST -----  Contact: Patient  Patient is returning your call  Please be advised    Patient can be reached at    [phone]

## 2020-02-27 NOTE — TELEPHONE ENCOUNTER
Spoke to patient call in reference to lab results.  Informed will send message to provider, verbalized understanding.

## 2020-02-27 NOTE — TELEPHONE ENCOUNTER
----- Message from Ayesha Marcos sent at 2/27/2020  1:45 PM CST -----  Contact: PATIENT  Type:  Patient Returning Call    Who Called:PATIENT  Who Left Message for Patient:NURSE  Does the patient know what this is regarding?:POSS LAB RESULTS  Would the patient rather a call back or a response via [facility]? CALL  Best Call Back Number:[phone]  Additional Information: PLEASE CALL PATIENT BACK TODAY

## 2020-03-09 ENCOUNTER — OFFICE VISIT (OUTPATIENT)
Dept: OPHTHALMOLOGY | Facility: CLINIC | Age: 80
End: 2020-03-09
Payer: MEDICARE

## 2020-03-09 DIAGNOSIS — Z79.4 CONTROLLED TYPE 2 DIABETES MELLITUS WITH BOTH EYES AFFECTED BY PROLIFERATIVE RETINOPATHY WITHOUT MACULAR EDEMA, WITH LONG-TERM CURRENT USE OF INSULIN: Primary | ICD-10-CM

## 2020-03-09 DIAGNOSIS — E11.3593 CONTROLLED TYPE 2 DIABETES MELLITUS WITH BOTH EYES AFFECTED BY PROLIFERATIVE RETINOPATHY WITHOUT MACULAR EDEMA, WITH LONG-TERM CURRENT USE OF INSULIN: Primary | ICD-10-CM

## 2020-03-09 PROCEDURE — 99999 PR PBB SHADOW E&M-EST. PATIENT-LVL II: ICD-10-PCS | Mod: PBBFAC,,, | Performed by: OPHTHALMOLOGY

## 2020-03-09 PROCEDURE — 92014 COMPRE OPH EXAM EST PT 1/>: CPT | Mod: S$GLB,,, | Performed by: OPHTHALMOLOGY

## 2020-03-09 PROCEDURE — 92014 PR EYE EXAM, EST PATIENT,COMPREHESV: ICD-10-PCS | Mod: S$GLB,,, | Performed by: OPHTHALMOLOGY

## 2020-03-09 PROCEDURE — 99999 PR PBB SHADOW E&M-EST. PATIENT-LVL II: CPT | Mod: PBBFAC,,, | Performed by: OPHTHALMOLOGY

## 2020-03-09 NOTE — PROGRESS NOTES
===============================  Scot Penaloza,  3/10/2020 today   79 y.o. male   Last visit [facility]: :Visit date not found   Last visit eye dept. Visit date not found  [facility]:  Corrected distance visual acuity was 20/20 in the right eye and CF at 3' in the left eye.  Tonometry     Tonometry (Applanation, 10:14 AM)       Right Left    Pressure 22 11               Not recorded         Not recorded         Not recorded        Chief Complaint   Patient presents with    Retinal Hemorrhage     Retinal Heme. Eval per Dr. Cowart       ________________  3/10/2020 today  HPI     Retinal Hemorrhage      Additional comments: Retinal Heme. Eval per Dr. Cowart              Comments     Dm since 1995  Sx when a child  pciol ou          Last edited by Cee Joyce on 3/9/2020 10:09 AM. 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      ===========================

## 2020-03-10 PROBLEM — E11.3593 CONTROLLED TYPE 2 DIABETES MELLITUS WITH BOTH EYES AFFECTED BY PROLIFERATIVE RETINOPATHY WITHOUT MACULAR EDEMA, WITH LONG-TERM CURRENT USE OF INSULIN: Status: ACTIVE | Noted: 2020-03-10

## 2020-03-10 PROBLEM — Z79.4 CONTROLLED TYPE 2 DIABETES MELLITUS WITH BOTH EYES AFFECTED BY PROLIFERATIVE RETINOPATHY WITHOUT MACULAR EDEMA, WITH LONG-TERM CURRENT USE OF INSULIN: Status: ACTIVE | Noted: 2020-03-10

## 2020-03-17 ENCOUNTER — TELEPHONE (OUTPATIENT)
Dept: OPHTHALMOLOGY | Facility: CLINIC | Age: 80
End: 2020-03-17

## 2020-03-17 NOTE — TELEPHONE ENCOUNTER
Returned call to patient.  He did not answer.  He is requesting to reschedule his appointment.  I left voicemail for him to call us back.

## 2020-03-17 NOTE — TELEPHONE ENCOUNTER
----- Message from Tatianna Rios sent at 3/17/2020 10:59 AM CDT -----  Contact: Pt   Pt requesting to reschedule appt    Please call and advise    Phone [phone]

## 2020-03-23 RX ORDER — POTASSIUM CHLORIDE 20 MEQ/1
TABLET, EXTENDED RELEASE ORAL
Qty: 90 TABLET | Refills: 3 | Status: SHIPPED | OUTPATIENT
Start: 2020-03-23

## 2020-04-22 ENCOUNTER — LAB VISIT (OUTPATIENT)
Dept: LAB | Facility: HOSPITAL | Age: 80
End: 2020-04-22
Attending: FAMILY MEDICINE
Payer: MEDICARE

## 2020-04-22 DIAGNOSIS — E11.9 TYPE 2 DIABETES MELLITUS WITHOUT COMPLICATION, WITHOUT LONG-TERM CURRENT USE OF INSULIN: Chronic | ICD-10-CM

## 2020-04-22 LAB
ALBUMIN SERPL BCP-MCNC: 3.9 G/DL (ref 3.5–5.2)
ALP SERPL-CCNC: 57 U/L (ref 55–135)
ALT SERPL W/O P-5'-P-CCNC: 15 U/L (ref 10–44)
ANION GAP SERPL CALC-SCNC: 11 MMOL/L (ref 8–16)
AST SERPL-CCNC: 16 U/L (ref 10–40)
BASOPHILS # BLD AUTO: 0.05 K/UL (ref 0–0.2)
BASOPHILS NFR BLD: 0.7 % (ref 0–1.9)
BILIRUB SERPL-MCNC: 0.5 MG/DL (ref 0.1–1)
BUN SERPL-MCNC: 11 MG/DL (ref 8–23)
CALCIUM SERPL-MCNC: 9.2 MG/DL (ref 8.7–10.5)
CHLORIDE SERPL-SCNC: 91 MMOL/L (ref 95–110)
CHOLEST SERPL-MCNC: 161 MG/DL (ref 120–199)
CHOLEST/HDLC SERPL: 2.3 {RATIO} (ref 2–5)
CO2 SERPL-SCNC: 30 MMOL/L (ref 23–29)
CREAT SERPL-MCNC: 0.9 MG/DL (ref 0.5–1.4)
DIFFERENTIAL METHOD: ABNORMAL
EOSINOPHIL # BLD AUTO: 0.1 K/UL (ref 0–0.5)
EOSINOPHIL NFR BLD: 1.6 % (ref 0–8)
ERYTHROCYTE [DISTWIDTH] IN BLOOD BY AUTOMATED COUNT: 12.7 % (ref 11.5–14.5)
EST. GFR  (AFRICAN AMERICAN): >60 ML/MIN/1.73 M^2
EST. GFR  (NON AFRICAN AMERICAN): >60 ML/MIN/1.73 M^2
ESTIMATED AVG GLUCOSE: 140 MG/DL (ref 68–131)
GLUCOSE SERPL-MCNC: 121 MG/DL (ref 70–110)
HBA1C MFR BLD HPLC: 6.5 % (ref 4–5.6)
HCT VFR BLD AUTO: 37.1 % (ref 40–54)
HDLC SERPL-MCNC: 69 MG/DL (ref 40–75)
HDLC SERPL: 42.9 % (ref 20–50)
HGB BLD-MCNC: 12.2 G/DL (ref 14–18)
IMM GRANULOCYTES # BLD AUTO: 0.02 K/UL (ref 0–0.04)
IMM GRANULOCYTES NFR BLD AUTO: 0.3 % (ref 0–0.5)
LDLC SERPL CALC-MCNC: 71.6 MG/DL (ref 63–159)
LYMPHOCYTES # BLD AUTO: 1.7 K/UL (ref 1–4.8)
LYMPHOCYTES NFR BLD: 24.3 % (ref 18–48)
MCH RBC QN AUTO: 31 PG (ref 27–31)
MCHC RBC AUTO-ENTMCNC: 32.9 G/DL (ref 32–36)
MCV RBC AUTO: 94 FL (ref 82–98)
MONOCYTES # BLD AUTO: 0.7 K/UL (ref 0.3–1)
MONOCYTES NFR BLD: 9.6 % (ref 4–15)
NEUTROPHILS # BLD AUTO: 4.5 K/UL (ref 1.8–7.7)
NEUTROPHILS NFR BLD: 63.5 % (ref 38–73)
NONHDLC SERPL-MCNC: 92 MG/DL
NRBC BLD-RTO: 0 /100 WBC
PLATELET # BLD AUTO: 263 K/UL (ref 150–350)
PMV BLD AUTO: 10.3 FL (ref 9.2–12.9)
POTASSIUM SERPL-SCNC: 3.6 MMOL/L (ref 3.5–5.1)
PROT SERPL-MCNC: 6.4 G/DL (ref 6–8.4)
RBC # BLD AUTO: 3.93 M/UL (ref 4.6–6.2)
SODIUM SERPL-SCNC: 132 MMOL/L (ref 136–145)
TRIGL SERPL-MCNC: 102 MG/DL (ref 30–150)
WBC # BLD AUTO: 7 K/UL (ref 3.9–12.7)

## 2020-04-22 PROCEDURE — 80061 LIPID PANEL: CPT

## 2020-04-22 PROCEDURE — 85025 COMPLETE CBC W/AUTO DIFF WBC: CPT

## 2020-04-22 PROCEDURE — 83036 HEMOGLOBIN GLYCOSYLATED A1C: CPT

## 2020-04-22 PROCEDURE — 36415 COLL VENOUS BLD VENIPUNCTURE: CPT | Mod: PO

## 2020-04-22 PROCEDURE — 80053 COMPREHEN METABOLIC PANEL: CPT

## 2020-04-23 ENCOUNTER — TELEPHONE (OUTPATIENT)
Dept: FAMILY MEDICINE | Facility: CLINIC | Age: 80
End: 2020-04-23

## 2020-04-23 NOTE — TELEPHONE ENCOUNTER
----- Message from Jayme Levin sent at 4/23/2020  8:50 AM CDT -----  Contact: PT   Type:  Patient Returning Call    Who Called: Pt   Who Left Message for Patient: nurse   Does the patient know what this is regarding?: no   Would the patient rather a call back or a response via [employer]? Call back   Best Call Back Number: [phone]  Additional Information: n/a

## 2020-04-29 ENCOUNTER — OFFICE VISIT (OUTPATIENT)
Dept: FAMILY MEDICINE | Facility: CLINIC | Age: 80
End: 2020-04-29
Payer: MEDICARE

## 2020-04-29 DIAGNOSIS — R15.9 BOWEL AND BLADDER INCONTINENCE: ICD-10-CM

## 2020-04-29 DIAGNOSIS — R32 BOWEL AND BLADDER INCONTINENCE: ICD-10-CM

## 2020-04-29 DIAGNOSIS — E11.9 TYPE 2 DIABETES MELLITUS WITHOUT COMPLICATION, WITHOUT LONG-TERM CURRENT USE OF INSULIN: Primary | Chronic | ICD-10-CM

## 2020-04-29 DIAGNOSIS — R19.5 DARK STOOLS: ICD-10-CM

## 2020-04-29 DIAGNOSIS — I10 HYPERTENSION, UNSPECIFIED TYPE: ICD-10-CM

## 2020-04-29 DIAGNOSIS — E78.2 MIXED HYPERLIPIDEMIA: ICD-10-CM

## 2020-04-29 PROCEDURE — 99441 PR PHYSICIAN TELEPHONE EVALUATION 5-10 MIN: ICD-10-PCS | Mod: 95,,, | Performed by: FAMILY MEDICINE

## 2020-04-29 PROCEDURE — 99441 PR PHYSICIAN TELEPHONE EVALUATION 5-10 MIN: CPT | Mod: 95,,, | Performed by: FAMILY MEDICINE

## 2020-04-29 RX ORDER — COLESTIPOL HYDROCHLORIDE 5 G/5G
5 GRANULE, FOR SUSPENSION ORAL 2 TIMES DAILY
Qty: 300 G | Refills: 12 | Status: SHIPPED | OUTPATIENT
Start: 2020-04-29 | End: 2021-04-29

## 2020-04-29 NOTE — PROGRESS NOTES
Established Patient - Audio Only Telehealth Visit     The patient location is: home    The chief complaint leading to consultation is:   Visit type: Virtual visit with audio only (telephone)     The reason for the audio only service rather than synchronous audio and video virtual visit was related to technical difficulties or patient preference/necessity.     Each patient to whom I provide medical services by telemedicine is:  (1) informed of the relationship between the physician and patient and the respective role of any other health care provider with respect to management of the patient; and (2) notified that they may decline to receive medical services by telemedicine and may withdraw from such care at any time. Patient verbally consented to receive this service via voice-only telephone call.       HPI:  This is a telephone visit for three-month follow-up of chronic medical problems include diabetes hypertension hyperlipidemia.  A1c stable  He has prostate cancer and has had radiation treatment ever since he did that this is back in November 2018 he has had urinary and fecal incontinence.  He has not discussed that with his urologist.  He also noticed some dark stools.     Assessment and plan:       Recommend that he check his stools for blood  Recommend that he discontinue metformin and see if the diarrhea improves also will try colestipol to form the stool  Recommend wearing condom catheter for urinary incontinence and recommend seeing a urologist  Follow-up 3 months other medical problems stable                 This service was not originating from a related E/M service provided within the previous 7 days nor will  to an E/M service or procedure within the next 24 hours or my soonest available appointment.  Prevailing standard of care was able to be met in this audio-only visit.

## 2020-05-05 RX ORDER — METOPROLOL TARTRATE 25 MG/1
TABLET, FILM COATED ORAL
Qty: 180 TABLET | Refills: 1 | Status: SHIPPED | OUTPATIENT
Start: 2020-05-05

## 2020-06-04 ENCOUNTER — TELEPHONE (OUTPATIENT)
Dept: FAMILY MEDICINE | Facility: CLINIC | Age: 80
End: 2020-06-04

## 2020-06-04 NOTE — TELEPHONE ENCOUNTER
----- Message from Fifi Constantino sent at 6/4/2020  3:45 PM CDT -----  Contact: Cee [phone]  Pt's wife would like return call; would like to have pt worked in next week for a physical pt is needed to be admitted to nursing home. Please call back at [phone]

## 2020-06-11 RX ORDER — LEVOTHYROXINE SODIUM 75 UG/1
75 TABLET ORAL DAILY
Qty: 90 TABLET | Refills: 1 | Status: SHIPPED | OUTPATIENT
Start: 2020-06-11 | End: 2020-06-12 | Stop reason: SDUPTHER

## 2020-06-11 NOTE — TELEPHONE ENCOUNTER
----- Message from Ciro Frey sent at 6/11/2020 10:40 AM CDT -----  Contact: self   Pt called to consult with nurse do to medication (levothyroxine (SYNTHROID) 75 MCG tablet)  Was denied and wanted to know if something else can be called in to his pharmacy.     [id number] - [place], [place] - [address] AT [facility] [address]  [address]  [facility] [address]  Phone: [phone] Fax: [phone]    Please call pt back at . Thanks tp

## 2020-06-12 RX ORDER — LEVOTHYROXINE SODIUM 75 UG/1
75 TABLET ORAL DAILY
Qty: 90 TABLET | Refills: 0 | Status: SHIPPED | OUTPATIENT
Start: 2020-06-12

## 2020-06-17 ENCOUNTER — OFFICE VISIT (OUTPATIENT)
Dept: HEMATOLOGY/ONCOLOGY | Facility: CLINIC | Age: 80
End: 2020-06-17
Payer: MEDICARE

## 2020-06-17 ENCOUNTER — LAB VISIT (OUTPATIENT)
Dept: LAB | Facility: HOSPITAL | Age: 80
End: 2020-06-17
Attending: INTERNAL MEDICINE
Payer: MEDICARE

## 2020-06-17 VITALS
HEIGHT: 72 IN | HEART RATE: 81 BPM | OXYGEN SATURATION: 97 % | TEMPERATURE: 98 F | SYSTOLIC BLOOD PRESSURE: 163 MMHG | RESPIRATION RATE: 18 BRPM | WEIGHT: 191.56 LBS | BODY MASS INDEX: 25.95 KG/M2 | DIASTOLIC BLOOD PRESSURE: 90 MMHG

## 2020-06-17 DIAGNOSIS — D64.9 NORMOCYTIC ANEMIA: ICD-10-CM

## 2020-06-17 DIAGNOSIS — R21 RASH: ICD-10-CM

## 2020-06-17 DIAGNOSIS — D64.9 NORMOCYTIC ANEMIA: Primary | ICD-10-CM

## 2020-06-17 LAB
BASOPHILS # BLD AUTO: 0.08 K/UL (ref 0–0.2)
BASOPHILS NFR BLD: 1 % (ref 0–1.9)
DIFFERENTIAL METHOD: ABNORMAL
EOSINOPHIL # BLD AUTO: 0.1 K/UL (ref 0–0.5)
EOSINOPHIL NFR BLD: 1.5 % (ref 0–8)
ERYTHROCYTE [DISTWIDTH] IN BLOOD BY AUTOMATED COUNT: 12.1 % (ref 11.5–14.5)
HCT VFR BLD AUTO: 38 % (ref 40–54)
HGB BLD-MCNC: 12.6 G/DL (ref 14–18)
IMM GRANULOCYTES # BLD AUTO: 0.02 K/UL (ref 0–0.04)
IMM GRANULOCYTES NFR BLD AUTO: 0.2 % (ref 0–0.5)
LYMPHOCYTES # BLD AUTO: 1.9 K/UL (ref 1–4.8)
LYMPHOCYTES NFR BLD: 23.3 % (ref 18–48)
MCH RBC QN AUTO: 31 PG (ref 27–31)
MCHC RBC AUTO-ENTMCNC: 33.2 G/DL (ref 32–36)
MCV RBC AUTO: 94 FL (ref 82–98)
MONOCYTES # BLD AUTO: 0.7 K/UL (ref 0.3–1)
MONOCYTES NFR BLD: 8.6 % (ref 4–15)
NEUTROPHILS # BLD AUTO: 5.3 K/UL (ref 1.8–7.7)
NEUTROPHILS NFR BLD: 65.4 % (ref 38–73)
NRBC BLD-RTO: 0 /100 WBC
PLATELET # BLD AUTO: 365 K/UL (ref 150–350)
PMV BLD AUTO: 9.6 FL (ref 9.2–12.9)
RBC # BLD AUTO: 4.06 M/UL (ref 4.6–6.2)
WBC # BLD AUTO: 8.16 K/UL (ref 3.9–12.7)

## 2020-06-17 PROCEDURE — 85025 COMPLETE CBC W/AUTO DIFF WBC: CPT

## 2020-06-17 PROCEDURE — 1159F MED LIST DOCD IN RCRD: CPT | Mod: S$GLB,,, | Performed by: INTERNAL MEDICINE

## 2020-06-17 PROCEDURE — 3080F PR MOST RECENT DIASTOLIC BLOOD PRESSURE >= 90 MM HG: ICD-10-PCS | Mod: CPTII,S$GLB,, | Performed by: INTERNAL MEDICINE

## 2020-06-17 PROCEDURE — 1126F PR PAIN SEVERITY QUANTIFIED, NO PAIN PRESENT: ICD-10-PCS | Mod: S$GLB,,, | Performed by: INTERNAL MEDICINE

## 2020-06-17 PROCEDURE — 99213 OFFICE O/P EST LOW 20 MIN: CPT | Mod: S$GLB,,, | Performed by: INTERNAL MEDICINE

## 2020-06-17 PROCEDURE — 1101F PR PT FALLS ASSESS DOC 0-1 FALLS W/OUT INJ PAST YR: ICD-10-PCS | Mod: CPTII,S$GLB,, | Performed by: INTERNAL MEDICINE

## 2020-06-17 PROCEDURE — 99999 PR PBB SHADOW E&M-EST. PATIENT-LVL IV: ICD-10-PCS | Mod: PBBFAC,,, | Performed by: INTERNAL MEDICINE

## 2020-06-17 PROCEDURE — 3077F SYST BP >= 140 MM HG: CPT | Mod: CPTII,S$GLB,, | Performed by: INTERNAL MEDICINE

## 2020-06-17 PROCEDURE — 1101F PT FALLS ASSESS-DOCD LE1/YR: CPT | Mod: CPTII,S$GLB,, | Performed by: INTERNAL MEDICINE

## 2020-06-17 PROCEDURE — 1159F PR MEDICATION LIST DOCUMENTED IN MEDICAL RECORD: ICD-10-PCS | Mod: S$GLB,,, | Performed by: INTERNAL MEDICINE

## 2020-06-17 PROCEDURE — 99213 PR OFFICE/OUTPT VISIT, EST, LEVL III, 20-29 MIN: ICD-10-PCS | Mod: S$GLB,,, | Performed by: INTERNAL MEDICINE

## 2020-06-17 PROCEDURE — 3080F DIAST BP >= 90 MM HG: CPT | Mod: CPTII,S$GLB,, | Performed by: INTERNAL MEDICINE

## 2020-06-17 PROCEDURE — 36415 COLL VENOUS BLD VENIPUNCTURE: CPT

## 2020-06-17 PROCEDURE — 1126F AMNT PAIN NOTED NONE PRSNT: CPT | Mod: S$GLB,,, | Performed by: INTERNAL MEDICINE

## 2020-06-17 PROCEDURE — 99999 PR PBB SHADOW E&M-EST. PATIENT-LVL IV: CPT | Mod: PBBFAC,,, | Performed by: INTERNAL MEDICINE

## 2020-06-17 PROCEDURE — 3077F PR MOST RECENT SYSTOLIC BLOOD PRESSURE >= 140 MM HG: ICD-10-PCS | Mod: CPTII,S$GLB,, | Performed by: INTERNAL MEDICINE

## 2020-06-17 RX ORDER — TAMSULOSIN HYDROCHLORIDE 0.4 MG/1
0.4 CAPSULE ORAL
COMMUNITY
Start: 2018-01-10

## 2020-06-17 NOTE — PROGRESS NOTES
Subjective:      DATE OF VISIT: 6/17/20     ?  Patient ID:?Scot Penaloza is a 79 y.o. male.?? MR#: [id number]   ?   PRIMARY HEMATOLOGIST: Dr. Anderson    ? Primary Care Providers:  Michael Zhao MD, MD (General)     CHIEF COMPLAINT: ?  Anemia??   ?   HPI    Mr. 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 Fonseca about reach general in 2018 and ADT completed in 2019.  He notes being in remission being followed by outside providers with PSA.      INTERVAL EVENTS    Since our last visit a couple months ago he has been taking iron supplementation 3 times daily as recommended and tolerating well without constipation or other GI upset.  He normally has baseline diarrhea.  He denies any melena, hematochezia, hemoptysis, hematemesis hematuria.  He does tell me he has a rash admission which is new.  No fevers, chills, night sweats or unintentional weight loss.    Review of Systems    ?   A comprehensive 14-point review of systems was reviewed with patient and was negative other than as specified above.   ?   PAST MEDICAL HISTORY:   Past Medical History:   Diagnosis Date    Cancer     prostate    Diabetes mellitus 2010    BS didn't check 02/03/2020    Encounter for blood transfusion     [place] (hard of hearing)     Hyperlipidemia     Hypertension     Stroke 2019    Thyroid disease     ?     PAST SURGICAL HISTORY:   Past Surgical History:   Procedure Laterality Date    CATARACT EXTRACTION      COLONOSCOPY      INGUINAL HERNIA REPAIR        ?   ALLERGIES:   Allergies as of 06/17/2020 - Reviewed 06/17/2020   Allergen Reaction Noted    Sulfa (sulfonamide antibiotics) Rash 08/29/2019      ?   MEDICATIONS:?   Outpatient Medications Marked as Taking for the 6/17/20 encounter (Office Visit) with Aleyda Anderson MD   Medication Sig Dispense Refill    aspirin  (ECOTRIN) 81 MG EC tablet Take 81 mg by mouth once daily.      blood sugar diagnostic Strp 1 each by Misc.(Non-Drug; Combo Route) route 3 (three) times daily. 100 each 12    colestipoL (COLESTID) 5 gram granules Take 5 g by mouth 2 (two) times daily. 300 g 12    furosemide (LASIX) 20 MG tablet TAKE 1 TABLET BY MOUTH EVERY DAY 90 tablet 1    levothyroxine (SYNTHROID) 75 MCG tablet Take 1 tablet (75 mcg total) by mouth once daily. 90 tablet 0    lisinopril-hydrochlorothiazide (PRINZIDE,ZESTORETIC) 20-25 mg Tab Take 1 tablet by mouth 2 (two) times daily. 60 tablet 5    metFORMIN (GLUCOPHAGE) 500 MG tablet Take 1 tablet (500 mg total) by mouth 2 (two) times daily with meals. 60 tablet 6    metoprolol tartrate (LOPRESSOR) 25 MG tablet TAKE 1 TABLET BY MOUTH TWICE DAILY 180 tablet 1    mirabegron (MYRBETRIQ) 50 mg Tb24 Take 1 tablet by mouth once daily.       potassium chloride SA (K-DUR,KLOR-CON) 20 MEQ tablet TAKE 3 TABLETS BY MOUTH EVERY DAY 90 tablet 3    simvastatin (ZOCOR) 20 MG tablet Take 1 tablet (20 mg total) by mouth every evening. 30 tablet 5    tamsulosin (FLOMAX) 0.4 mg Cap Take 0.4 mg by mouth.      torsemide (DEMADEX) 10 MG Tab Take 1 tablet (10 mg total) by mouth once daily. 30 tablet 5    TRUE METRIX GLUCOSE METER Misc USE UTD      TRUEPLUS LANCETS 33 gauge Misc USE TID UTD        ?   SOCIAL HISTORY:?   Social History     Tobacco Use    Smoking status: Never Smoker    Smokeless tobacco: Never Used   Substance Use Topics    Alcohol use: Not Currently      ?      ?   FAMILY HISTORY:   family history includes Cancer in his mother; Hypertension in his child; Stroke in his father and mother.   ?        Objective:      Physical Exam      ?   Vitals:    06/17/20 0950   BP: (!) 163/90   Pulse: 81   Resp: 18   Temp: 98 °F (36.7 °C)      ?   ECOG:?0   General appearance: Generally well appearing, in no acute distress.   Head, eyes, ears, nose, and throat:  Left eye closure and mild droop, stable.   Oropharynx clear with moist mucous membranes.   Cardiovascular: Regular rate and rhythm, S1, S2, no audible murmurs.   Respiratory: Lungs clear to auscultation bilaterally.   Abdomen: Bowel sounds present, soft, nontender, nondistended.   Extremities: Warm, without edema.   Neurologic: Alert and oriented. Grossly normal strength, coordination, and gait slowed, left lower extremity weakness   Skin: No rashes, ecchymoses or petechial lesion.   ?   ?   Laboratory:  ?   Lab Visit on 06/17/2020   Component Date Value Ref Range Status    WBC 06/17/2020 8.16  3.90 - 12.70 K/uL Final    RBC 06/17/2020 4.06* 4.60 - 6.20 M/uL Final    Hemoglobin 06/17/2020 12.6* 14.0 - 18.0 g/dL Final    Hematocrit 06/17/2020 38.0* 40.0 - 54.0 % Final    Mean Corpuscular Volume 06/17/2020 94  82 - 98 fL Final    Mean Corpuscular Hemoglobin 06/17/2020 31.0  27.0 - 31.0 pg Final    Mean Corpuscular Hemoglobin Conc 06/17/2020 33.2  32.0 - 36.0 g/dL Final    RDW 06/17/2020 12.1  11.5 - 14.5 % Final    Platelets 06/17/2020 365* 150 - 350 K/uL Final    MPV 06/17/2020 9.6  9.2 - 12.9 fL Final    Immature Granulocytes 06/17/2020 0.2  0.0 - 0.5 % Final    Gran # (ANC) 06/17/2020 5.3  1.8 - 7.7 K/uL Final    Immature Grans (Abs) 06/17/2020 0.02  0.00 - 0.04 K/uL Final    Lymph # 06/17/2020 1.9  1.0 - 4.8 K/uL Final    Mono # 06/17/2020 0.7  0.3 - 1.0 K/uL Final    Eos # 06/17/2020 0.1  0.0 - 0.5 K/uL Final    Baso # 06/17/2020 0.08  0.00 - 0.20 K/uL Final    nRBC 06/17/2020 0  0 /100 WBC Final    Gran% 06/17/2020 65.4  38.0 - 73.0 % Final    Lymph% 06/17/2020 23.3  18.0 - 48.0 % Final    Mono% 06/17/2020 8.6  4.0 - 15.0 % Final    Eosinophil% 06/17/2020 1.5  0.0 - 8.0 % Final    Basophil% 06/17/2020 1.0  0.0 - 1.9 % Final    Differential Method 06/17/2020 Automated   Final          ?   Assessment/Plan:       1. Normocytic anemia    2. Rash          Plan:     # normocytic anemia:  Labs available only since August 2018 with  relatively stable mild normocytic anemia with hemoglobin around 12.  No evidence of bleeding and he notes being up-to-date on colonoscopy but may be close to do on repeat.  He does have neuropathy which may be secondary to type 2 diabetes.  Prior labs showed normal vitamin B12 and folate levels.  Possible mild iron deficiency with recommendation for iron supplementation now presenting 3 months after 3 times daily oral iron supplementation with only mild improvement in hemoglobin continues to have mild normocytic anemia hemoglobin 12 point 2.  No other concerning cytopenias or signs or symptoms of bleeding.  Recommended continuation 1 tab oral iron daily with follow-up in 6 months for repeat CBC.    # prostate cancer history:  Per patient report status post radiation therapy with Dr. Fonseca at [facility].  Recommend follow-up with his outside providers for surveillance of prostate cancer.    Rash:  Recommended follow-up with primary care    Follow-Up:   - labs today  - RV pending lab work    I have spent 15 minutes in this office visit in the direct face-to-face communication with the patient, with greater than 50% of the time spend in the counseling and coordination of care.   ?

## 2020-06-18 ENCOUNTER — OFFICE VISIT (OUTPATIENT)
Dept: FAMILY MEDICINE | Facility: CLINIC | Age: 80
End: 2020-06-18
Payer: MEDICARE

## 2020-06-18 VITALS
DIASTOLIC BLOOD PRESSURE: 84 MMHG | BODY MASS INDEX: 28.89 KG/M2 | OXYGEN SATURATION: 97 % | RESPIRATION RATE: 18 BRPM | SYSTOLIC BLOOD PRESSURE: 182 MMHG | TEMPERATURE: 98 F | HEART RATE: 87 BPM | WEIGHT: 213 LBS

## 2020-06-18 DIAGNOSIS — Z79.4 CONTROLLED TYPE 2 DIABETES MELLITUS WITH BOTH EYES AFFECTED BY PROLIFERATIVE RETINOPATHY WITHOUT MACULAR EDEMA, WITH LONG-TERM CURRENT USE OF INSULIN: ICD-10-CM

## 2020-06-18 DIAGNOSIS — L97.919 STASIS ULCER OF LOWER EXTREMITY, RIGHT: ICD-10-CM

## 2020-06-18 DIAGNOSIS — I83.019 STASIS ULCER OF LOWER EXTREMITY, RIGHT: ICD-10-CM

## 2020-06-18 DIAGNOSIS — E11.3593 CONTROLLED TYPE 2 DIABETES MELLITUS WITH BOTH EYES AFFECTED BY PROLIFERATIVE RETINOPATHY WITHOUT MACULAR EDEMA, WITH LONG-TERM CURRENT USE OF INSULIN: ICD-10-CM

## 2020-06-18 DIAGNOSIS — L03.116 CELLULITIS OF LEFT LOWER EXTREMITY: ICD-10-CM

## 2020-06-18 DIAGNOSIS — I73.9 PVD (PERIPHERAL VASCULAR DISEASE): Primary | ICD-10-CM

## 2020-06-18 PROCEDURE — 1159F PR MEDICATION LIST DOCUMENTED IN MEDICAL RECORD: ICD-10-PCS | Mod: S$GLB,,, | Performed by: FAMILY MEDICINE

## 2020-06-18 PROCEDURE — 3288F FALL RISK ASSESSMENT DOCD: CPT | Mod: CPTII,S$GLB,, | Performed by: FAMILY MEDICINE

## 2020-06-18 PROCEDURE — 1125F PR PAIN SEVERITY QUANTIFIED, PAIN PRESENT: ICD-10-PCS | Mod: S$GLB,,, | Performed by: FAMILY MEDICINE

## 2020-06-18 PROCEDURE — 1159F MED LIST DOCD IN RCRD: CPT | Mod: S$GLB,,, | Performed by: FAMILY MEDICINE

## 2020-06-18 PROCEDURE — 3079F PR MOST RECENT DIASTOLIC BLOOD PRESSURE 80-89 MM HG: ICD-10-PCS | Mod: CPTII,S$GLB,, | Performed by: FAMILY MEDICINE

## 2020-06-18 PROCEDURE — 99999 PR PBB SHADOW E&M-EST. PATIENT-LVL V: ICD-10-PCS | Mod: PBBFAC,,, | Performed by: FAMILY MEDICINE

## 2020-06-18 PROCEDURE — 3079F DIAST BP 80-89 MM HG: CPT | Mod: CPTII,S$GLB,, | Performed by: FAMILY MEDICINE

## 2020-06-18 PROCEDURE — 3077F PR MOST RECENT SYSTOLIC BLOOD PRESSURE >= 140 MM HG: ICD-10-PCS | Mod: CPTII,S$GLB,, | Performed by: FAMILY MEDICINE

## 2020-06-18 PROCEDURE — 3288F PR FALLS RISK ASSESSMENT DOCUMENTED: ICD-10-PCS | Mod: CPTII,S$GLB,, | Performed by: FAMILY MEDICINE

## 2020-06-18 PROCEDURE — 1100F PTFALLS ASSESS-DOCD GE2>/YR: CPT | Mod: CPTII,S$GLB,, | Performed by: FAMILY MEDICINE

## 2020-06-18 PROCEDURE — 99214 OFFICE O/P EST MOD 30 MIN: CPT | Mod: S$GLB,,, | Performed by: FAMILY MEDICINE

## 2020-06-18 PROCEDURE — 1125F AMNT PAIN NOTED PAIN PRSNT: CPT | Mod: S$GLB,,, | Performed by: FAMILY MEDICINE

## 2020-06-18 PROCEDURE — 1100F PR PT FALLS ASSESS DOC 2+ FALLS/FALL W/INJURY/YR: ICD-10-PCS | Mod: CPTII,S$GLB,, | Performed by: FAMILY MEDICINE

## 2020-06-18 PROCEDURE — 3077F SYST BP >= 140 MM HG: CPT | Mod: CPTII,S$GLB,, | Performed by: FAMILY MEDICINE

## 2020-06-18 PROCEDURE — 99999 PR PBB SHADOW E&M-EST. PATIENT-LVL V: CPT | Mod: PBBFAC,,, | Performed by: FAMILY MEDICINE

## 2020-06-18 PROCEDURE — 99214 PR OFFICE/OUTPT VISIT, EST, LEVL IV, 30-39 MIN: ICD-10-PCS | Mod: S$GLB,,, | Performed by: FAMILY MEDICINE

## 2020-06-18 RX ORDER — CEPHALEXIN 500 MG/1
500 CAPSULE ORAL EVERY 8 HOURS
Qty: 21 CAPSULE | Refills: 0 | Status: SHIPPED | OUTPATIENT
Start: 2020-06-18 | End: 2020-06-25

## 2020-06-18 NOTE — PATIENT INSTRUCTIONS
Leg Swelling in Both Legs    Swelling of the feet, ankles, and legs is called edema. It is caused by excess fluid that has collected in the tissues. Extra fluid in the body settles in the lowest part because of gravity. This is why the legs and feet are most affected.  Some of the causes for edema include:  · Disease of the heart like congestive heart failure  · Standing or sitting for long periods of time  · Infection of the feet or legs  · Blood pooling in the veins of your legs (venous insufficiency)  · Dilated veins in your lower leg (varicose veins)  · Garters or other clothing that is tight on your legs. This will cause blood to pool in your legs because the clothing limits blood flow.  · Some medicines such as hormones like birth control pills, some blood pressure medicines like calcium channel blockers (amlodipine) and steroids, some antidepressants like MAO inhibitors and tricyclics  · Menstrual periods that cause you to retain fluids  · Many types of renal disease  · Liver failure or cirrhosis  · Pregnancy, some swelling is normal, but a sudden increase in leg swelling or weight gain can be a sign of a dangerous complication of pregnancy  · Poor nutrition  · Thyroid disease  Medical treatment will depend on what is causing the swelling in your legs. Your healthcare provider may prescribe water pills (diuretics) to get rid of the extra fluid.  Home care  Follow these guidelines when caring for yourself at home:  · Don't wear clothing like garters that is tight on your legs.  · Keep your legs up while lying or sitting.  · If infection, injury, or recent surgery is causing the swelling, stay off your legs as much as possible until symptoms get better.  · If your healthcare provider says that your leg swelling is caused by venous insufficiency or varicose veins, don't sit or  one place for long periods of time. Take breaks and walk about every few hours. Brisk walking is a good exercise. It helps  circulate the blood that has collected in your leg. Talk with your provider about using support stockings to stop daytime leg swelling.  · If your provider says that heart disease is causing your leg swelling, follow a low-salt diet to stop extra fluid from staying in your body. You may also need medicine.  Follow-up care  Follow up with your healthcare provider, or as advised.  When to seek medical advice  Call your healthcare provider right away if any of these occur:  · New shortness of breath or chest pain  · Shortness of breath or chest pain that gets worse  · Swelling in both legs or ankles that gets worse  · Swelling of the abdomen  · Redness, warmth, or swelling in one leg  · Fever of 100.4ºF (38ºC) or higher, or as directed by your healthcare provider  · Yellow color to your skin or eyes  · Rapid, unexplained weight gain  · Having to sleep upright or use an increased number of pillows  Date Last Reviewed: 3/31/2016  © 7881-3177 [employer], [employer]. [address] [place] [address], [place] [address]. All rights reserved. This information is not intended as a substitute for professional medical care. Always follow your healthcare professional's instructions.

## 2020-06-18 NOTE — PROGRESS NOTES
Subjective:       Patient ID: Scot Penaloza is a 79 y.o. male.  Accompanied by his daughter.  Chief Complaint: Edema and sores To Lower legs and follow up on diabetic med      History of Present Illness:   Scot Penaloza 79 y.o. male presents today with   Worsening BLE edema: +4 pitting edema. Weeping edema with intact blisters and  Medial shallow ulcer to the right medial lower leg. Ulcer has been present for a week.  Denies resting pain.  He is on Demadex, lasix and HCTZ and his med is being managed by Dr Enrique.  Thinks the edema started when he was asked to stop taking metformin due to side effects-diarrhea.  He has resumed taking the metformi. He has controlled DM II and his AIC has always being controlled.  Normal kidney function.    Past Medical History:   Diagnosis Date    Cancer     prostate    Diabetes mellitus 2010    BS didn't check 02/03/2020    Encounter for blood transfusion     [place] (hard of hearing)     Hyperlipidemia     Hypertension     Stroke 2019    Thyroid disease      Family History   Problem Relation Age of Onset    Stroke Mother     Cancer Mother     Stroke Father     Hypertension Child      Social History     Socioeconomic History    Marital status:      Spouse name: Not on file    Number of children: Not on file    Years of education: Not on file    Highest education level: Not on file   Occupational History    Not on file   Social Needs    Financial resource strain: Not on file    Food insecurity     Worry: Not on file     Inability: Not on file    Transportation needs     Medical: Not on file     Non-medical: Not on file   Tobacco Use    Smoking status: Never Smoker    Smokeless tobacco: Never Used   Substance and Sexual Activity    Alcohol use: Not Currently    Drug use: Never    Sexual activity: Not Currently   Lifestyle    Physical activity     Days per week: Not on file     Minutes per session: Not on file    Stress: Not on file    Relationships    Social connections     Talks on phone: Not on file     Gets together: Not on file     Attends [employer] service: Not on file     Active member of club or organization: Not on file     Attends meetings of clubs or organizations: Not on file     Relationship status: Not on file   Other Topics Concern    Not on file   Social History Narrative    Not on file     Outpatient Encounter Medications as of 6/18/2020   Medication Sig Dispense Refill    aspirin (ECOTRIN) 81 MG EC tablet Take 81 mg by mouth once daily.      blood sugar diagnostic Strp 1 each by Misc.(Non-Drug; Combo Route) route 3 (three) times daily. 100 each 12    colestipoL (COLESTID) 5 gram granules Take 5 g by mouth 2 (two) times daily. 300 g 12    furosemide (LASIX) 20 MG tablet TAKE 1 TABLET BY MOUTH EVERY DAY 90 tablet 1    levothyroxine (SYNTHROID) 75 MCG tablet Take 1 tablet (75 mcg total) by mouth once daily. 90 tablet 0    lisinopril-hydrochlorothiazide (PRINZIDE,ZESTORETIC) 20-25 mg Tab Take 1 tablet by mouth 2 (two) times daily. 60 tablet 5    metFORMIN (GLUCOPHAGE) 500 MG tablet Take 1 tablet (500 mg total) by mouth 2 (two) times daily with meals. 60 tablet 6    metoprolol tartrate (LOPRESSOR) 25 MG tablet TAKE 1 TABLET BY MOUTH TWICE DAILY 180 tablet 1    mirabegron (MYRBETRIQ) 50 mg Tb24 Take 1 tablet by mouth once daily.       potassium chloride SA (K-DUR,KLOR-CON) 20 MEQ tablet TAKE 3 TABLETS BY MOUTH EVERY DAY 90 tablet 3    simvastatin (ZOCOR) 20 MG tablet Take 1 tablet (20 mg total) by mouth every evening. 30 tablet 5    tamsulosin (FLOMAX) 0.4 mg Cap Take 0.4 mg by mouth.      torsemide (DEMADEX) 10 MG Tab Take 1 tablet (10 mg total) by mouth once daily. 30 tablet 5    TRUE METRIX GLUCOSE METER Misc USE UTD      TRUEPLUS LANCETS 33 gauge Misc USE TID UTD      cephALEXin (KEFLEX) 500 MG capsule Take 1 capsule (500 mg total) by mouth every 8 (eight) hours. for 7 days 21 capsule 0     No  facility-administered encounter medications on file as of 6/18/2020.        Review of Systems   Constitutional: Negative for appetite change and fever.   HENT: Negative for congestion, facial swelling and voice change.    Eyes: Negative for discharge and itching.   Respiratory: Negative for cough, chest tightness, shortness of breath and wheezing.    Cardiovascular: Positive for leg swelling. Negative for chest pain and palpitations.   Gastrointestinal: Negative for abdominal pain, nausea and vomiting.   Endocrine: Negative for cold intolerance and heat intolerance.   Genitourinary: Negative for dysuria and flank pain.   Musculoskeletal: Negative for myalgias and neck stiffness.   Skin: Positive for wound. Negative for pallor and rash.   Neurological: Negative for facial asymmetry and weakness.   Psychiatric/Behavioral: Negative for agitation and confusion.       Objective:      BP (!) 182/84 (BP Location: Right arm, Patient Position: Sitting, BP Method: Large (Manual))   Pulse 87   Temp 97.8 °F (36.6 °C) (Oral)   Resp 18   Wt 96.6 kg (213 lb)   SpO2 97%   BMI 28.89 kg/m²   Physical Exam  Cardiovascular:      Rate and Rhythm: Normal rate and regular rhythm.      Pulses:           Posterior tibial pulses are 1+ on the right side and 1+ on the left side.   Pulmonary:      Effort: Pulmonary effort is normal.      Breath sounds: Normal breath sounds.   Skin:               Results for orders placed or performed in visit on 06/17/20   CBC auto differential   Result Value Ref Range    WBC 8.16 3.90 - 12.70 K/uL            RBC 4.06 (L) 4.60 - 6.20 M/uL    Hemoglobin 12.6 (L) 14.0 - 18.0 g/dL    Hematocrit 38.0 (L) 40.0 - 54.0 %    Mean Corpuscular Volume 94 82 - 98 fL    Mean Corpuscular Hemoglobin 31.0 27.0 - 31.0 pg    Mean Corpuscular Hemoglobin Conc 33.2 32.0 - 36.0 g/dL    RDW 12.1 11.5 - 14.5 %    Platelets 365 (H) 150 - 350 K/uL    MPV 9.6 9.2 - 12.9 fL    Immature Granulocytes 0.2 0.0 - 0.5 %    Gran # (ANC)  5.3 1.8 - 7.7 K/uL    Immature Grans (Abs) 0.02 0.00 - 0.04 K/uL    Lymph # 1.9 1.0 - 4.8 K/uL    Mono # 0.7 0.3 - 1.0 K/uL    Eos # 0.1 0.0 - 0.5 K/uL    Baso # 0.08 0.00 - 0.20 K/uL    nRBC 0 0 /100 WBC    Gran% 65.4 38.0 - 73.0 %    Lymph% 23.3 18.0 - 48.0 %    Mono% 8.6 4.0 - 15.0 %    Eosinophil% 1.5 0.0 - 8.0 %    Basophil% 1.0 0.0 - 1.9 %    Differential Method Automated      Assessment:       1. PVD (peripheral vascular disease)    2. 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 Stasis ulcer of lower extremity, right    4. 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 for 7 days  Dispense: 21 capsule; Refill: 0    Take extra dose of lasix today and tomorrow.   Call tomorrow for an update

## 2020-06-19 ENCOUNTER — TELEPHONE (OUTPATIENT)
Dept: FAMILY MEDICINE | Facility: CLINIC | Age: 80
End: 2020-06-19

## 2020-06-19 NOTE — TELEPHONE ENCOUNTER
----- Message from Ayesha Marcos sent at 6/19/2020  8:15 AM CDT -----  Contact: Anastacia wife  Calling to speak with provider's nurse. Please call wife ASAP today @ [phone]. thanks

## 2020-06-22 NOTE — PROGRESS NOTES
According to dr benitez notes she didn't request a follow up visit. Her note only states, rv pending lab work.

## 2020-06-26 DIAGNOSIS — D64.9 NORMOCYTIC ANEMIA: Primary | ICD-10-CM

## 2020-07-13 ENCOUNTER — TELEPHONE (OUTPATIENT)
Dept: FAMILY MEDICINE | Facility: CLINIC | Age: 80
End: 2020-07-13

## 2020-07-13 ENCOUNTER — EXTERNAL HOME HEALTH (OUTPATIENT)
Dept: HOME HEALTH SERVICES | Facility: HOSPITAL | Age: 80
End: 2020-07-13

## 2020-07-13 NOTE — TELEPHONE ENCOUNTER
----- Message from Amanda Najera sent at 7/13/2020  8:05 AM CDT -----  Contact: wife(Anastacia)-[phone]  Would like to consult with nurse regarding canceling home nurse visit for today. Patient has had death in family and want be there. Please call back at [phone]. Thanks/ar

## 2020-07-16 ENCOUNTER — EXTERNAL HOME HEALTH (OUTPATIENT)
Dept: HOME HEALTH SERVICES | Facility: HOSPITAL | Age: 80
End: 2020-07-16

## 2020-07-17 ENCOUNTER — DOCUMENT SCAN (OUTPATIENT)
Dept: HOME HEALTH SERVICES | Facility: HOSPITAL | Age: 80
End: 2020-07-17
Payer: MEDICARE

## 2020-07-20 ENCOUNTER — DOCUMENT SCAN (OUTPATIENT)
Dept: HOME HEALTH SERVICES | Facility: HOSPITAL | Age: 80
End: 2020-07-20
Payer: MEDICARE

## 2020-07-22 ENCOUNTER — PATIENT OUTREACH (OUTPATIENT)
Dept: ADMINISTRATIVE | Facility: HOSPITAL | Age: 80
End: 2020-07-22

## 2020-07-22 NOTE — PROGRESS NOTES
PreVisit Chart Audit Performed    updated with recent information     Recent BP   182/84   Called pt to schedule AWV Pt wife states that pt is being admitted into [facility].     Liyah CAMPO LPN Care Coordinator  Care Coordination Department  [facility]  [phone]

## 2020-07-27 ENCOUNTER — DOCUMENT SCAN (OUTPATIENT)
Dept: HOME HEALTH SERVICES | Facility: HOSPITAL | Age: 80
End: 2020-07-27
Payer: MEDICARE

## 2020-07-27 ENCOUNTER — LAB VISIT (OUTPATIENT)
Dept: LAB | Facility: HOSPITAL | Age: 80
End: 2020-07-27
Attending: FAMILY MEDICINE
Payer: MEDICARE

## 2020-07-27 ENCOUNTER — OFFICE VISIT (OUTPATIENT)
Dept: FAMILY MEDICINE | Facility: CLINIC | Age: 80
End: 2020-07-27
Payer: MEDICARE

## 2020-07-27 VITALS
BODY MASS INDEX: 28.78 KG/M2 | OXYGEN SATURATION: 95 % | SYSTOLIC BLOOD PRESSURE: 160 MMHG | HEART RATE: 66 BPM | DIASTOLIC BLOOD PRESSURE: 80 MMHG | WEIGHT: 212.19 LBS

## 2020-07-27 DIAGNOSIS — E78.2 MIXED HYPERLIPIDEMIA: ICD-10-CM

## 2020-07-27 DIAGNOSIS — E11.9 TYPE 2 DIABETES MELLITUS WITHOUT COMPLICATION, WITHOUT LONG-TERM CURRENT USE OF INSULIN: Chronic | ICD-10-CM

## 2020-07-27 DIAGNOSIS — Z79.4 CONTROLLED TYPE 2 DIABETES MELLITUS WITH BOTH EYES AFFECTED BY PROLIFERATIVE RETINOPATHY WITHOUT MACULAR EDEMA, WITH LONG-TERM CURRENT USE OF INSULIN: ICD-10-CM

## 2020-07-27 DIAGNOSIS — L60.0 INGROWN NAIL OF GREAT TOE OF LEFT FOOT: ICD-10-CM

## 2020-07-27 DIAGNOSIS — L03.032 PARONYCHIA OF GREAT TOE, LEFT: ICD-10-CM

## 2020-07-27 DIAGNOSIS — E03.9 ACQUIRED HYPOTHYROIDISM: ICD-10-CM

## 2020-07-27 DIAGNOSIS — E11.3593 CONTROLLED TYPE 2 DIABETES MELLITUS WITH BOTH EYES AFFECTED BY PROLIFERATIVE RETINOPATHY WITHOUT MACULAR EDEMA, WITH LONG-TERM CURRENT USE OF INSULIN: ICD-10-CM

## 2020-07-27 DIAGNOSIS — I10 HYPERTENSION NOT AT GOAL: Primary | ICD-10-CM

## 2020-07-27 LAB
BASOPHILS # BLD AUTO: 0.05 K/UL (ref 0–0.2)
BASOPHILS NFR BLD: 0.8 % (ref 0–1.9)
DIFFERENTIAL METHOD: ABNORMAL
EOSINOPHIL # BLD AUTO: 0.1 K/UL (ref 0–0.5)
EOSINOPHIL NFR BLD: 1.9 % (ref 0–8)
ERYTHROCYTE [DISTWIDTH] IN BLOOD BY AUTOMATED COUNT: 12.8 % (ref 11.5–14.5)
HCT VFR BLD AUTO: 40.1 % (ref 40–54)
HGB BLD-MCNC: 12.8 G/DL (ref 14–18)
IMM GRANULOCYTES # BLD AUTO: 0.01 K/UL (ref 0–0.04)
IMM GRANULOCYTES NFR BLD AUTO: 0.2 % (ref 0–0.5)
LYMPHOCYTES # BLD AUTO: 1.6 K/UL (ref 1–4.8)
LYMPHOCYTES NFR BLD: 26.1 % (ref 18–48)
MCH RBC QN AUTO: 31.2 PG (ref 27–31)
MCHC RBC AUTO-ENTMCNC: 31.9 G/DL (ref 32–36)
MCV RBC AUTO: 98 FL (ref 82–98)
MONOCYTES # BLD AUTO: 0.7 K/UL (ref 0.3–1)
MONOCYTES NFR BLD: 10.5 % (ref 4–15)
NEUTROPHILS # BLD AUTO: 3.8 K/UL (ref 1.8–7.7)
NEUTROPHILS NFR BLD: 60.5 % (ref 38–73)
NRBC BLD-RTO: 0 /100 WBC
PLATELET # BLD AUTO: 243 K/UL (ref 150–350)
PMV BLD AUTO: 10.8 FL (ref 9.2–12.9)
RBC # BLD AUTO: 4.1 M/UL (ref 4.6–6.2)
WBC # BLD AUTO: 6.29 K/UL (ref 3.9–12.7)

## 2020-07-27 PROCEDURE — 80053 COMPREHEN METABOLIC PANEL: CPT

## 2020-07-27 PROCEDURE — 3079F PR MOST RECENT DIASTOLIC BLOOD PRESSURE 80-89 MM HG: ICD-10-PCS | Mod: CPTII,S$GLB,, | Performed by: FAMILY MEDICINE

## 2020-07-27 PROCEDURE — 99214 OFFICE O/P EST MOD 30 MIN: CPT | Mod: S$GLB,,, | Performed by: FAMILY MEDICINE

## 2020-07-27 PROCEDURE — 1100F PR PT FALLS ASSESS DOC 2+ FALLS/FALL W/INJURY/YR: ICD-10-PCS | Mod: CPTII,S$GLB,, | Performed by: FAMILY MEDICINE

## 2020-07-27 PROCEDURE — 83036 HEMOGLOBIN GLYCOSYLATED A1C: CPT

## 2020-07-27 PROCEDURE — 3288F FALL RISK ASSESSMENT DOCD: CPT | Mod: CPTII,S$GLB,, | Performed by: FAMILY MEDICINE

## 2020-07-27 PROCEDURE — 3288F PR FALLS RISK ASSESSMENT DOCUMENTED: ICD-10-PCS | Mod: CPTII,S$GLB,, | Performed by: FAMILY MEDICINE

## 2020-07-27 PROCEDURE — 1126F PR PAIN SEVERITY QUANTIFIED, NO PAIN PRESENT: ICD-10-PCS | Mod: S$GLB,,, | Performed by: FAMILY MEDICINE

## 2020-07-27 PROCEDURE — 3077F SYST BP >= 140 MM HG: CPT | Mod: CPTII,S$GLB,, | Performed by: FAMILY MEDICINE

## 2020-07-27 PROCEDURE — 1100F PTFALLS ASSESS-DOCD GE2>/YR: CPT | Mod: CPTII,S$GLB,, | Performed by: FAMILY MEDICINE

## 2020-07-27 PROCEDURE — 3079F DIAST BP 80-89 MM HG: CPT | Mod: CPTII,S$GLB,, | Performed by: FAMILY MEDICINE

## 2020-07-27 PROCEDURE — 1159F MED LIST DOCD IN RCRD: CPT | Mod: S$GLB,,, | Performed by: FAMILY MEDICINE

## 2020-07-27 PROCEDURE — 3077F PR MOST RECENT SYSTOLIC BLOOD PRESSURE >= 140 MM HG: ICD-10-PCS | Mod: CPTII,S$GLB,, | Performed by: FAMILY MEDICINE

## 2020-07-27 PROCEDURE — 1159F PR MEDICATION LIST DOCUMENTED IN MEDICAL RECORD: ICD-10-PCS | Mod: S$GLB,,, | Performed by: FAMILY MEDICINE

## 2020-07-27 PROCEDURE — 99214 PR OFFICE/OUTPT VISIT, EST, LEVL IV, 30-39 MIN: ICD-10-PCS | Mod: S$GLB,,, | Performed by: FAMILY MEDICINE

## 2020-07-27 PROCEDURE — 99999 PR PBB SHADOW E&M-EST. PATIENT-LVL V: CPT | Mod: PBBFAC,,, | Performed by: FAMILY MEDICINE

## 2020-07-27 PROCEDURE — 85025 COMPLETE CBC W/AUTO DIFF WBC: CPT

## 2020-07-27 PROCEDURE — 80061 LIPID PANEL: CPT

## 2020-07-27 PROCEDURE — 99999 PR PBB SHADOW E&M-EST. PATIENT-LVL V: ICD-10-PCS | Mod: PBBFAC,,, | Performed by: FAMILY MEDICINE

## 2020-07-27 PROCEDURE — 1126F AMNT PAIN NOTED NONE PRSNT: CPT | Mod: S$GLB,,, | Performed by: FAMILY MEDICINE

## 2020-07-27 PROCEDURE — 36415 COLL VENOUS BLD VENIPUNCTURE: CPT | Mod: PO

## 2020-07-27 RX ORDER — AMLODIPINE BESYLATE 5 MG/1
5 TABLET ORAL DAILY
Qty: 30 TABLET | Refills: 11 | Status: SHIPPED | OUTPATIENT
Start: 2020-07-27 | End: 2021-07-27

## 2020-07-27 RX ORDER — CEPHALEXIN 500 MG/1
500 CAPSULE ORAL EVERY 8 HOURS
Qty: 30 CAPSULE | Refills: 0 | Status: SHIPPED | OUTPATIENT
Start: 2020-07-27 | End: 2020-08-21

## 2020-07-27 NOTE — PROGRESS NOTES
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  He has been doing some physical therapy  He also has diabetes for few years he says diabetes been well controlled.  Last A1c was 6.5 only taking metformin daily due to diarrhea      He has had prostate cancer currently on radiation therapy and now he is on hormone therapy.  Patient's oncologist is Dr. Fonseca and his urologist is Dr. Chakraborty    Blood pressure is elevated today says his blood pressure does stay high at home also.    He also sees a cardiologist and he has had issues with low potassium is taking potassium pills the diuretics that he is on were increased and ever since his potassium is been an issue.    Also has mild anemia which appears to be improving based on last labs.    He has got an infection of the left big toe      ROS:  Review of Systems   Constitutional: Negative for activity change, chills, fatigue, fever and unexpected weight change.   HENT: Negative for congestion, ear discharge, ear pain, hearing loss, postnasal drip and rhinorrhea.    Eyes: Negative for pain and visual disturbance.   Respiratory: Negative for cough, chest tightness and shortness of breath.    Cardiovascular: Negative for chest pain and palpitations.   Gastrointestinal: Negative for abdominal pain, diarrhea and vomiting.   Endocrine: Negative for heat intolerance.   Genitourinary: Negative for dysuria, flank pain, frequency and hematuria.   Musculoskeletal: Negative for back pain, gait problem and neck pain.   Skin: Negative for color change and rash.   Neurological: Negative for dizziness, tremors, seizures, numbness and headaches.   Psychiatric/Behavioral: Negative for agitation, hallucinations, self-injury, sleep disturbance and suicidal ideas. The patient is not nervous/anxious.         Past Medical History:   Diagnosis Date    Cancer     prostate    Diabetes mellitus 2010    BS didn't check 02/03/2020    Encounter for blood transfusion     [place] (hard of hearing)     Hyperlipidemia     Hypertension     Stroke 2019    Thyroid disease        Social History:  Social History     Socioeconomic History    Marital status:      Spouse name: Not on file    Number of children: Not on file    Years of education: Not on file    Highest education level: Not on file   Occupational History    Not on file   Social Needs    Financial resource strain: Not on file    Food insecurity     Worry: Not on file     Inability: Not on file    Transportation needs     Medical: Not on file     Non-medical: Not on file   Tobacco Use    Smoking status: Never Smoker    Smokeless tobacco: Never Used   Substance and Sexual Activity    Alcohol use: Not Currently    Drug use: Never    Sexual activity: Not Currently   Lifestyle    Physical activity     Days per week: Not on file     Minutes per session: Not on file    Stress: Not on file   Relationships    Social connections     Talks on phone: Not on file     Gets together: Not on file     Attends Taoist service: Not on file     Active member of club or organization: Not on file     Attends meetings of clubs or organizations: Not on file     Relationship status: Not on file   Other Topics Concern    Not on file   Social History Narrative    Not on file       Family History:   family history includes Cancer in his mother; Hypertension in his child; Stroke in his father and mother.    Health Maintenance   Topic Date Due    Hepatitis C Screening  1940    Foot Exam  09[birth year]/1952    Hemoglobin A1c  10/22/2020    Eye Exam  03/09/2021    Lipid Panel  04/22/2021    TETANUS VACCINE  01/22/2030    Pneumococcal Vaccine (65+ High/Highest Risk)  Completed       Physical Exam:    Vital Signs  Pulse: 66  SpO2: 95 %  BP: (!) 180/80  BP Location:  Right arm  Patient Position: Sitting  Pain Score: 0-No pain  Height and Weight  Weight: 96.2 kg (212 lb 3.1 oz)]    Body mass index is 28.78 kg/m².    Physical Exam  Constitutional:       Appearance: He is well-developed.   Eyes:      Conjunctiva/sclera: Conjunctivae normal.      Pupils: Pupils are equal, round, and reactive to light.   Neck:      Musculoskeletal: Normal range of motion and neck supple.   Cardiovascular:      Rate and Rhythm: Normal rate and regular rhythm.      Heart sounds: Normal heart sounds. No murmur.   Pulmonary:      Effort: Pulmonary effort is normal. No respiratory distress.      Breath sounds: Normal breath sounds. No wheezing or rales.   Chest:      Chest wall: No tenderness.   Abdominal:      General: There is no distension.      Palpations: Abdomen is soft. There is no mass.      Tenderness: There is no abdominal tenderness. There is no guarding.   Musculoskeletal:         General: No tenderness.        Feet:       Comments: Patient is wheelchair-bound, requires assistance to stand up   Lymphadenopathy:      Cervical: No cervical adenopathy.   Skin:     General: Skin is warm and dry.   Neurological:      Mental Status: He is alert and oriented to person, place, and time.      Deep Tendon Reflexes: Reflexes are normal and symmetric.   Psychiatric:         Behavior: Behavior normal.         Thought Content: Thought content normal.         Judgment: Judgment normal.           Assessment:      ICD-10-CM ICD-9-CM   1. Hypertension not at goal  I10 401.9   2. 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 Mixed hyperlipidemia  E78.2 272.2   4. Paronychia of great toe, left  L03.032 681.11   5. Ingrown nail of great toe of left foot  L60.0 703.0   6. Acquired hypothyroidism  E03.9 244.9         Plan:        will treat him with Keflex and and  start soaking the toe in warm Epsom salt water for about 20 min.  He will need excision of the lateral nail fold.  Will schedule an appointment with podiatry while he is on antibiotic.  Add amlodipine to be taken at nighttime take lisinopril and metoprolol in the morning.  Monitor blood pressure 2 times a day and call back with numbers.    Will check labs today  Follow-up 3 months    Orders Placed This Encounter   Procedures    Hemoglobin A1C    Comprehensive metabolic panel    CBC auto differential    Microalbumin/creatinine urine ratio    Lipid Panel    TSH    Ambulatory referral/consult to Podiatry       Current Outpatient Medications   Medication Sig Dispense Refill    aspirin (ECOTRIN) 81 MG EC tablet Take 81 mg by mouth once daily.      blood sugar diagnostic Strp 1 each by Misc.(Non-Drug; Combo Route) route 3 (three) times daily. 100 each 12    furosemide (LASIX) 20 MG tablet TAKE 1 TABLET BY MOUTH EVERY DAY 90 tablet 1    levothyroxine (SYNTHROID) 75 MCG tablet Take 1 tablet (75 mcg total) by mouth once daily. 90 tablet 0    lisinopriL-hydrochlorothiazide (PRINZIDE,ZESTORETIC) 20-25 mg Tab TAKE 1 TABLET BY MOUTH TWICE DAILY 60 tablet 5    metFORMIN (GLUCOPHAGE) 500 MG tablet Take 1 tablet (500 mg total) by mouth 2 (two) times daily with meals. (Patient taking differently: Take 500 mg by mouth daily with breakfast. ) 60 tablet 6    metoprolol tartrate (LOPRESSOR) 25 MG tablet TAKE 1 TABLET BY MOUTH TWICE DAILY 180 tablet 1    mirabegron (MYRBETRIQ) 50 mg Tb24 Take 1 tablet by mouth once daily.       potassium chloride SA (K-DUR,KLOR-CON) 20 MEQ tablet TAKE 3 TABLETS BY MOUTH EVERY DAY 90 tablet 3    simvastatin (ZOCOR) 20 MG tablet TAKE 1 TABLET(20 MG) BY MOUTH EVERY EVENING 30 tablet 5    tamsulosin (FLOMAX) 0.4 mg Cap Take 0.4 mg by mouth.      torsemide (DEMADEX) 10 MG Tab Take 1 tablet (10 mg total) by mouth once daily. 30 tablet 5    TRUE METRIX GLUCOSE METER Misc USE UTD       TRUEPLUS LANCETS 33 gauge Misc USE TID UTD      amLODIPine (NORVASC) 5 MG tablet Take 1 tablet (5 mg total) by mouth once daily. 30 tablet 11    cephALEXin (KEFLEX) 500 MG capsule Take 1 capsule (500 mg total) by mouth every 8 (eight) hours. 30 capsule 0    colestipoL (COLESTID) 5 gram granules Take 5 g by mouth 2 (two) times daily. (Patient not taking: Reported on 7/27/2020) 300 g 12     No current facility-administered medications for this visit.        There are no discontinued medications.    Follow up in about 3 months (around 10/27/2020).      Cleve Nam MD

## 2020-07-28 LAB
ALBUMIN SERPL BCP-MCNC: 4 G/DL (ref 3.5–5.2)
ALP SERPL-CCNC: 59 U/L (ref 55–135)
ALT SERPL W/O P-5'-P-CCNC: 10 U/L (ref 10–44)
ANION GAP SERPL CALC-SCNC: 10 MMOL/L (ref 8–16)
AST SERPL-CCNC: 13 U/L (ref 10–40)
BILIRUB SERPL-MCNC: 0.4 MG/DL (ref 0.1–1)
BUN SERPL-MCNC: 22 MG/DL (ref 8–23)
CALCIUM SERPL-MCNC: 9.3 MG/DL (ref 8.7–10.5)
CHLORIDE SERPL-SCNC: 101 MMOL/L (ref 95–110)
CHOLEST SERPL-MCNC: 153 MG/DL (ref 120–199)
CHOLEST/HDLC SERPL: 2.6 {RATIO} (ref 2–5)
CO2 SERPL-SCNC: 29 MMOL/L (ref 23–29)
CREAT SERPL-MCNC: 1 MG/DL (ref 0.5–1.4)
EST. GFR  (AFRICAN AMERICAN): >60 ML/MIN/1.73 M^2
EST. GFR  (NON AFRICAN AMERICAN): >60 ML/MIN/1.73 M^2
ESTIMATED AVG GLUCOSE: 151 MG/DL (ref 68–131)
GLUCOSE SERPL-MCNC: 137 MG/DL (ref 70–110)
HBA1C MFR BLD HPLC: 6.9 % (ref 4–5.6)
HDLC SERPL-MCNC: 59 MG/DL (ref 40–75)
HDLC SERPL: 38.6 % (ref 20–50)
LDLC SERPL CALC-MCNC: 78.2 MG/DL (ref 63–159)
NONHDLC SERPL-MCNC: 94 MG/DL
POTASSIUM SERPL-SCNC: 3.5 MMOL/L (ref 3.5–5.1)
PROT SERPL-MCNC: 6.6 G/DL (ref 6–8.4)
SODIUM SERPL-SCNC: 140 MMOL/L (ref 136–145)
TRIGL SERPL-MCNC: 79 MG/DL (ref 30–150)

## 2020-08-03 ENCOUNTER — OFFICE VISIT (OUTPATIENT)
Dept: PODIATRY | Facility: CLINIC | Age: 80
End: 2020-08-03
Payer: MEDICARE

## 2020-08-03 VITALS
HEART RATE: 83 BPM | DIASTOLIC BLOOD PRESSURE: 77 MMHG | HEIGHT: 72 IN | BODY MASS INDEX: 28.93 KG/M2 | SYSTOLIC BLOOD PRESSURE: 134 MMHG | WEIGHT: 213.63 LBS

## 2020-08-03 DIAGNOSIS — L03.032 PARONYCHIA OF GREAT TOE, LEFT: Primary | ICD-10-CM

## 2020-08-03 DIAGNOSIS — L60.0 INGROWN NAIL OF GREAT TOE OF LEFT FOOT: ICD-10-CM

## 2020-08-03 DIAGNOSIS — I87.2 VENOUS INSUFFICIENCY OF BOTH LOWER EXTREMITIES: ICD-10-CM

## 2020-08-03 DIAGNOSIS — E11.51 TYPE 2 DIABETES MELLITUS WITH DIABETIC PERIPHERAL ANGIOPATHY WITHOUT GANGRENE, UNSPECIFIED WHETHER LONG TERM INSULIN USE: ICD-10-CM

## 2020-08-03 DIAGNOSIS — B35.1 ONYCHOMYCOSIS: ICD-10-CM

## 2020-08-03 PROCEDURE — 1101F PR PT FALLS ASSESS DOC 0-1 FALLS W/OUT INJ PAST YR: ICD-10-PCS | Mod: CPTII,S$GLB,, | Performed by: PODIATRIST

## 2020-08-03 PROCEDURE — 99203 PR OFFICE/OUTPT VISIT, NEW, LEVL III, 30-44 MIN: ICD-10-PCS | Mod: 25,S$GLB,, | Performed by: PODIATRIST

## 2020-08-03 PROCEDURE — 99203 OFFICE O/P NEW LOW 30 MIN: CPT | Mod: 25,S$GLB,, | Performed by: PODIATRIST

## 2020-08-03 PROCEDURE — 99999 PR PBB SHADOW E&M-EST. PATIENT-LVL IV: ICD-10-PCS | Mod: PBBFAC,,, | Performed by: PODIATRIST

## 2020-08-03 PROCEDURE — 3078F PR MOST RECENT DIASTOLIC BLOOD PRESSURE < 80 MM HG: ICD-10-PCS | Mod: CPTII,S$GLB,, | Performed by: PODIATRIST

## 2020-08-03 PROCEDURE — 11730 PR REMOVAL OF NAIL PLATE: ICD-10-PCS | Mod: TA,S$GLB,, | Performed by: PODIATRIST

## 2020-08-03 PROCEDURE — 3075F SYST BP GE 130 - 139MM HG: CPT | Mod: CPTII,S$GLB,, | Performed by: PODIATRIST

## 2020-08-03 PROCEDURE — 3078F DIAST BP <80 MM HG: CPT | Mod: CPTII,S$GLB,, | Performed by: PODIATRIST

## 2020-08-03 PROCEDURE — 1101F PT FALLS ASSESS-DOCD LE1/YR: CPT | Mod: CPTII,S$GLB,, | Performed by: PODIATRIST

## 2020-08-03 PROCEDURE — 1126F PR PAIN SEVERITY QUANTIFIED, NO PAIN PRESENT: ICD-10-PCS | Mod: S$GLB,,, | Performed by: PODIATRIST

## 2020-08-03 PROCEDURE — 11730 AVULSION NAIL PLATE SIMPLE 1: CPT | Mod: TA,S$GLB,, | Performed by: PODIATRIST

## 2020-08-03 PROCEDURE — 11721 PR DEBRIDEMENT OF NAILS, 6 OR MORE: ICD-10-PCS | Mod: Q8,59,S$GLB, | Performed by: PODIATRIST

## 2020-08-03 PROCEDURE — 3075F PR MOST RECENT SYSTOLIC BLOOD PRESS GE 130-139MM HG: ICD-10-PCS | Mod: CPTII,S$GLB,, | Performed by: PODIATRIST

## 2020-08-03 PROCEDURE — 99999 PR PBB SHADOW E&M-EST. PATIENT-LVL IV: CPT | Mod: PBBFAC,,, | Performed by: PODIATRIST

## 2020-08-03 PROCEDURE — 1126F AMNT PAIN NOTED NONE PRSNT: CPT | Mod: S$GLB,,, | Performed by: PODIATRIST

## 2020-08-03 PROCEDURE — 1159F PR MEDICATION LIST DOCUMENTED IN MEDICAL RECORD: ICD-10-PCS | Mod: S$GLB,,, | Performed by: PODIATRIST

## 2020-08-03 PROCEDURE — 11721 DEBRIDE NAIL 6 OR MORE: CPT | Mod: Q8,59,S$GLB, | Performed by: PODIATRIST

## 2020-08-03 PROCEDURE — 1159F MED LIST DOCD IN RCRD: CPT | Mod: S$GLB,,, | Performed by: PODIATRIST

## 2020-08-04 ENCOUNTER — DOCUMENT SCAN (OUTPATIENT)
Dept: HOME HEALTH SERVICES | Facility: HOSPITAL | Age: 80
End: 2020-08-04
Payer: MEDICARE

## 2020-08-14 ENCOUNTER — TELEPHONE (OUTPATIENT)
Dept: FAMILY MEDICINE | Facility: CLINIC | Age: 80
End: 2020-08-14

## 2020-08-14 NOTE — TELEPHONE ENCOUNTER
----- Message from Catherine Varela sent at 8/14/2020  1:35 PM CDT -----  Regarding: request call back  Mrs. Penaloza request call back she stated she left messages and no call back . Will like a call back today  did not want to say reason of call   call back;  [phone] (home)

## 2020-08-20 ENCOUNTER — TELEPHONE (OUTPATIENT)
Dept: FAMILY MEDICINE | Facility: CLINIC | Age: 80
End: 2020-08-20

## 2020-08-20 NOTE — TELEPHONE ENCOUNTER
"----- Message from Cassy Vergara sent at 8/20/2020  4:26 PM CDT -----  Type:  Needs Medical Advice    Who Called: Scot TOBI Penaloza' wife Anastacia   Would the patient rather a call back or a response via [facility]? Call back   Best Call Back Number: [phone] (cell)   Additional Information:    Patient's wife calling in regards to NURSING HOME PAPER WORK being completed by Dr. Nam. Patient's wife is stating that she called 5 times last week in regards to the patient Scot Penaloza paper work.   Patient's wife also stated that she is trying to get the patient into a nursing home, " [facility]."     "

## 2020-08-21 ENCOUNTER — TELEPHONE (OUTPATIENT)
Dept: FAMILY MEDICINE | Facility: CLINIC | Age: 80
End: 2020-08-21

## 2020-08-21 PROCEDURE — G0179 PR HOME HEALTH MD RECERTIFICATION: ICD-10-PCS | Mod: ,,, | Performed by: FAMILY MEDICINE

## 2020-08-21 PROCEDURE — G0179 MD RECERTIFICATION HHA PT: HCPCS | Mod: ,,, | Performed by: FAMILY MEDICINE

## 2020-08-21 NOTE — TELEPHONE ENCOUNTER
----- Message from Moriah Richter sent at 8/21/2020  1:33 PM CDT -----  Regarding: Paperwork for nursing home  Contact: Patient's wife -Anastacia  Please call concerning paperwork for [facility]. Please call patient's wife at ph [phone].

## 2020-08-21 NOTE — TELEPHONE ENCOUNTER
Attempted to contact patients spouse had to leave a voicemail. Paper work on Dr. Nam's desk for completion.

## 2020-08-21 NOTE — TELEPHONE ENCOUNTER
Attempted to call patient's daughter , there was no answer, message left to call the clinic back at their convenience.

## 2020-09-02 ENCOUNTER — EXTERNAL HOME HEALTH (OUTPATIENT)
Dept: HOME HEALTH SERVICES | Facility: HOSPITAL | Age: 80
End: 2020-09-02
Payer: MEDICARE

## 2020-09-30 NOTE — SUBJECTIVE & OBJECTIVE
Past Medical History:   Diagnosis Date    Cancer     prostate    Diabetes mellitus     Encounter for blood transfusion     [place] (hard of hearing)     Hypertension     Stroke 2019    Thyroid disease        Past Surgical History:   Procedure Laterality Date    CATARACT EXTRACTION         Review of patient's allergies indicates:   Allergen Reactions    Sulfa (sulfonamide antibiotics) Rash       No current facility-administered medications on file prior to encounter.      Current Outpatient Medications on File Prior to Encounter   Medication Sig    aspirin (ECOTRIN) 81 MG EC tablet Take 81 mg by mouth once daily.    dextromethorphan-guaifenesin  mg (MUCINEX DM)  mg per 12 hr tablet Take 1 tablet by mouth every 12 (twelve) hours.    levothyroxine (SYNTHROID) 25 MCG tablet Take 25 mcg by mouth once daily.    lisinopril-hydrochlorothiazide (PRINZIDE,ZESTORETIC) 20-25 mg Tab Take 1 tablet by mouth once daily.    metoprolol succinate (TOPROL-XL) 25 MG 24 hr tablet Take 25 mg by mouth once daily.    mirabegron (MYRBETRIQ) 50 mg Tb24 Take by mouth.    simvastatin (ZOCOR) 20 MG tablet Take 20 mg by mouth every evening.    torsemide (DEMADEX) 10 MG Tab Take 20 mg by mouth once daily.     Family History     None        Tobacco Use    Smoking status: Never Smoker    Smokeless tobacco: Never Used   Substance and Sexual Activity    Alcohol use: Not Currently    Drug use: Never    Sexual activity: Not Currently     Review of Systems   Constitutional: Positive for diaphoresis. Negative for activity change, chills, fatigue and fever.   HENT: Negative for congestion and sore throat.    Eyes: Negative for visual disturbance.   Respiratory: Negative for cough, chest tightness, shortness of breath and wheezing.    Cardiovascular: Negative for chest pain, palpitations and leg swelling.   Gastrointestinal: Negative for abdominal distention, abdominal pain, blood in stool, constipation, diarrhea, nausea and  vomiting.   Genitourinary: Negative for decreased urine volume, difficulty urinating, dysuria, frequency, hematuria and urgency.   Musculoskeletal: Negative for arthralgias, back pain, gait problem, myalgias and neck pain.   Skin: Positive for pallor. Negative for rash and wound.   Neurological: Positive for syncope, weakness (generalized) and light-headedness. Negative for dizziness, seizures, facial asymmetry, speech difficulty, numbness and headaches.   Psychiatric/Behavioral: Negative for confusion. The patient is not nervous/anxious.    All other systems reviewed and are negative.    Objective:     Vital Signs (Most Recent):  Temp: 98.1 °F (36.7 °C) (08/30/19 0043)  Pulse: 64 (08/30/19 0043)  Resp: 18 (08/30/19 0043)  BP: (!) 156/77 (08/30/19 0043)  SpO2: 100 % (08/30/19 0043) Vital Signs (24h Range):  Temp:  [98 °F (36.7 °C)-98.1 °F (36.7 °C)] 98.1 °F (36.7 °C)  Pulse:  [50-64] 64  Resp:  [10-23] 18  SpO2:  [96 %-100 %] 100 %  BP: ()/(54-81) 156/77        There is no height or weight on file to calculate BMI.    Physical Exam   Constitutional: He is oriented to person, place, and time. He appears well-developed and well-nourished. No distress.   HENT:   Head: Normocephalic and atraumatic.   Eyes: Conjunctivae are normal.   PERRL; EOM intact.   Neck: Normal range of motion. Neck supple. Normal carotid pulses present. Carotid bruit is not present.   Cardiovascular: Normal rate, regular rhythm, S1 normal, S2 normal and intact distal pulses.  No extrasystoles are present. Exam reveals no gallop and no friction rub.   No murmur heard.  Pulses:       Radial pulses are 2+ on the right side, and 2+ on the left side.        Dorsalis pedis pulses are 2+ on the right side, and 2+ on the left side.        Posterior tibial pulses are 2+ on the right side, and 2+ on the left side.   Pulmonary/Chest: Effort normal and breath sounds normal. No accessory muscle usage. No tachypnea. No respiratory distress. He has no  wheezes. He has no rhonchi. He has no rales.   Abdominal: Soft. Bowel sounds are normal. He exhibits no distension. There is no tenderness. There is no rigidity, no rebound, no guarding and no CVA tenderness.   Musculoskeletal: Normal range of motion. He exhibits no edema, tenderness or deformity.   Neurological: He is alert and oriented to person, place, and time. No cranial nerve deficit or sensory deficit. GCS eye subscore is 4. GCS verbal subscore is 5. GCS motor subscore is 6.   Muscle strength 5/5 to RUE and RLE, 4/5 to LUE and 2/5 to LLE from previous CVA.  No acute focal deficits appreciated.    Skin: Skin is warm, dry and intact. Capillary refill takes less than 2 seconds. No rash noted. He is not diaphoretic. No cyanosis or erythema.   Psychiatric: He has a normal mood and affect. His behavior is normal. His speech is not slurred. Cognition and memory are normal.   Nursing note and vitals reviewed.          Significant Labs:  Results for orders placed or performed during the hospital encounter of 08/29/19   Acetaminophen level   Result Value Ref Range    Acetaminophen (Tylenol), Serum <3.0 (L) 10.0 - 20.0 ug/mL   APTT   Result Value Ref Range    aPTT 22.5 21.0 - 32.0 sec   CBC auto differential   Result Value Ref Range    WBC 10.16 3.90 - 12.70 K/uL    RBC 3.85 (L) 4.60 - 6.20 M/uL    Hemoglobin 11.8 (L) 14.0 - 18.0 g/dL    Hematocrit 35.6 (L) 40.0 - 54.0 %    Mean Corpuscular Volume 93 82 - 98 fL    Mean Corpuscular Hemoglobin 30.6 27.0 - 31.0 pg    Mean Corpuscular Hemoglobin Conc 33.1 32.0 - 36.0 g/dL    RDW 13.3 11.5 - 14.5 %    Platelets 231 150 - 350 K/uL    MPV 10.4 9.2 - 12.9 fL    Gran # (ANC) 8.5 (H) 1.8 - 7.7 K/uL    Lymph # 1.0 1.0 - 4.8 K/uL    Mono # 0.6 0.3 - 1.0 K/uL    Eos # 0.1 0.0 - 0.5 K/uL    Baso # 0.02 0.00 - 0.20 K/uL    Gran% 84.0 (H) 38.0 - 73.0 %    Lymph% 9.8 (L) 18.0 - 48.0 %    Mono% 5.5 4.0 - 15.0 %    Eosinophil% 0.6 0.0 - 8.0 %    Basophil% 0.2 0.0 - 1.9 %    Differential  Method Automated    CK   Result Value Ref Range     (H) 20 - 200 U/L   Brain natriuretic peptide   Result Value Ref Range    BNP 14 0 - 99 pg/mL   Comprehensive metabolic panel   Result Value Ref Range    Sodium 142 136 - 145 mmol/L    Potassium 2.6 (LL) 3.5 - 5.1 mmol/L    Chloride 96 95 - 110 mmol/L    CO2 32 (H) 23 - 29 mmol/L    Glucose 142 (H) 70 - 110 mg/dL    BUN, Bld 33 (H) 8 - 23 mg/dL    Creatinine 1.1 0.5 - 1.4 mg/dL    Calcium 8.8 8.7 - 10.5 mg/dL    Total Protein 6.1 6.0 - 8.4 g/dL    Albumin 3.7 3.5 - 5.2 g/dL    Total Bilirubin 0.7 0.1 - 1.0 mg/dL    Alkaline Phosphatase 45 (L) 55 - 135 U/L    AST 14 10 - 40 U/L    ALT 16 10 - 44 U/L    Anion Gap 14 8 - 16 mmol/L    eGFR if African American >60 >60 mL/min/1.73 m^2    eGFR if non African American >60 >60 mL/min/1.73 m^2   Drug screen panel, emergency   Result Value Ref Range    Benzodiazepines Negative     Methadone metabolites Negative     Cocaine (Metab.) Negative     Opiate Scrn, Ur Negative     Barbiturate Screen, Ur Negative     Amphetamine Screen, Ur Negative     THC Negative     Phencyclidine Negative     Creatinine, Random Ur 104.3 23.0 - 375.0 mg/dL    Toxicology Information SEE COMMENT    Magnesium   Result Value Ref Range    Magnesium 1.7 1.6 - 2.6 mg/dL   Protime-INR   Result Value Ref Range    Prothrombin Time 11.1 9.0 - 12.5 sec    INR 1.0 0.8 - 1.2   Salicylate level   Result Value Ref Range    Salicylate Lvl <5.0 (L) 15.0 - 30.0 mg/dL   Troponin I   Result Value Ref Range    Troponin I 0.030 (H) 0.000 - 0.026 ng/mL   TSH   Result Value Ref Range    TSH 3.013 0.400 - 4.000 uIU/mL   Urinalysis, Reflex to Urine Culture Urine, Clean Catch   Result Value Ref Range    Specimen UA Urine, Clean Catch     Color, UA Yellow Yellow, Straw, Thu    Appearance, UA Clear Clear    pH, UA 7.0 5.0 - 8.0    Specific Gravity, UA 1.010 1.005 - 1.030    Protein, UA Negative Negative    Glucose, UA Negative Negative    Ketones, UA Negative Negative     Bilirubin (UA) Negative Negative    Occult Blood UA Negative Negative    Nitrite, UA Negative Negative    Urobilinogen, UA Negative <2.0 EU/dL    Leukocytes, UA 3+ (A) Negative   Urinalysis Microscopic   Result Value Ref Range    WBC, UA 25 (H) 0 - 5 /hpf    Bacteria Many (A) None-Occ /hpf    Microscopic Comment SEE COMMENT       All pertinent labs within the past 24 hours have been reviewed.    Significant Imaging:  Imaging Results          CT Cervical Spine Without Contrast (Final result)  Result time 08/29/19 21:21:37    Final result by ARIA Francis Sr., MD (08/29/19 21:21:37)                 Impression:      1. There is reversal of the normal cervical lordosis.  2. There is a large posterior bridging osteophyte between the C5 and C7 vertebral bodies.  3. There is grade 1 anterolisthesis of C4 on C5.  4. There are areas of relative radiolucency scattered throughout the visualized skeletal system.  If additional imaging evaluation is clinically indicated, recommend consideration of a nonemergent nuclear medicine bone scan.  All CT scans at this facility use dose modulation, iterative reconstruction, and/or weight base dosing when appropriate to reduce radiation dose when appropriate to reduce radiation dose to as low as reasonably achievable.      Electronically signed by: Michael Francis MD  Date:    08/29/2019  Time:    21:21             Narrative:    EXAMINATION:  CT CERVICAL SPINE WITHOUT CONTRAST    CLINICAL HISTORY:  Loss of consciousness    TECHNIQUE:  Standard cervical spine CT protocol was performed without IV contrast.    COMPARISON:  None    FINDINGS:  There is reversal of the normal cervical lordosis.  There is a large posterior bridging osteophyte between the C5 and C7 vertebral bodies.  There is grade 1 anterolisthesis of C4 on C5.  There is no fracture or scoliosis.  There are areas of relative radiolucency scattered throughout the visualized skeletal system.  The soft tissue of the neck is  normal in appearance.                               CT Head Without Contrast (Final result)  Result time 08/29/19 21:16:46    Final result by ARIA Francis Sr., MD (08/29/19 21:16:46)                 Impression:      1. There are chronic appearing ischemic changes lateral to the anterior aspect of the body of the left lateral ventricle. There is no evidence of an acute ischemic event.  2. There is no intracranial hemorrhage.  All CT scans at this facility use dose modulation, iterative reconstruction, and/or weight base dosing when appropriate to reduce radiation dose when appropriate to reduce radiation dose to as low as reasonably achievable.      Electronically signed by: Michael Francis MD  Date:    08/29/2019  Time:    21:16             Narrative:    EXAMINATION:  CT HEAD WITHOUT CONTRAST    CLINICAL HISTORY:  Confusion/delirium, altered LOC, unexplained;    TECHNIQUE:  Standard brain CT protocol without IV contrast was performed.    COMPARISON:  None    FINDINGS:  There are chronic appearing ischemic changes lateral to the anterior aspect of the body of the left lateral ventricle.  There is no evidence of an acute ischemic event.  There is no intracranial hemorrhage.  There is no skull fracture. The paranasal sinuses are normal in appearance.                               X-Ray Chest AP Portable (Final result)  Result time 08/29/19 20:43:44    Final result by ARIA Francis Sr., MD (08/29/19 20:43:44)                 Impression:      1. This is a limited examination secondary to the lack of inclusion of the entire thorax on the film. The right costophrenic angle is not completely included on the film.  2. There is a subtle amount of haziness in the lateral aspect of the inferior 3rd of the left lung.  This is characteristic of atelectasis or subtle pneumonia.  .      Electronically signed by: Michael Francis MD  Date:    08/29/2019  Time:    20:43             Narrative:    EXAMINATION:  XR CHEST AP  PORTABLE    CLINICAL HISTORY:  Loss of consciousness    COMPARISON:  None    FINDINGS:  This is a limited examination secondary to the lack of inclusion of the entire thorax on the film.  The right costophrenic angle is not completely included on the film.  The size of the heart is normal.  There is a subtle amount of haziness in the lateral aspect of the inferior 3rd of the left lung.  The visualized portion of the right lung is clear.  There is no pneumothorax.  The left costophrenic angle is sharp.                               I have reviewed all pertinent imaging results/findings within the past 24 hours.     EKG: (personally reviewed)  Sinus bradycardia, incomplete RBBB, mildly prolonged QT with QTc of 498 ms, no acute ischemic ST-T abnormalities.  No previous to compare.                 No needs noted at present time

## 2020-10-16 ENCOUNTER — TELEPHONE (OUTPATIENT)
Dept: HEMATOLOGY/ONCOLOGY | Facility: CLINIC | Age: 80
End: 2020-10-16

## 2020-10-16 NOTE — TELEPHONE ENCOUNTER
----- Message from Tessy Guzman sent at 10/16/2020 11:16 AM CDT -----  Contact: Denise carmichael/ Sean   [employer] is reqesuting a call regarding orders forms that were received. Ref # [id number]. Please advise

## 2021-01-11 ENCOUNTER — TELEPHONE (OUTPATIENT)
Dept: HEMATOLOGY/ONCOLOGY | Facility: CLINIC | Age: 81
End: 2021-01-11

## 2021-01-25 LAB — HBA1C MFR BLD: 6.9 % (ref 4–6)

## 2021-01-28 ENCOUNTER — OFFICE VISIT (OUTPATIENT)
Dept: HEMATOLOGY/ONCOLOGY | Facility: CLINIC | Age: 81
End: 2021-01-28
Payer: MEDICARE

## 2021-01-28 ENCOUNTER — LAB VISIT (OUTPATIENT)
Dept: LAB | Facility: HOSPITAL | Age: 81
End: 2021-01-28
Attending: INTERNAL MEDICINE
Payer: MEDICARE

## 2021-01-28 VITALS
HEART RATE: 71 BPM | HEIGHT: 72 IN | SYSTOLIC BLOOD PRESSURE: 166 MMHG | RESPIRATION RATE: 16 BRPM | TEMPERATURE: 97 F | OXYGEN SATURATION: 97 % | WEIGHT: 219.13 LBS | DIASTOLIC BLOOD PRESSURE: 79 MMHG | BODY MASS INDEX: 29.68 KG/M2

## 2021-01-28 DIAGNOSIS — D64.9 NORMOCYTIC ANEMIA: ICD-10-CM

## 2021-01-28 DIAGNOSIS — D64.9 NORMOCYTIC ANEMIA: Primary | ICD-10-CM

## 2021-01-28 DIAGNOSIS — C43.20: ICD-10-CM

## 2021-01-28 LAB
BASOPHILS # BLD AUTO: 0.05 K/UL (ref 0–0.2)
BASOPHILS NFR BLD: 0.7 % (ref 0–1.9)
DIFFERENTIAL METHOD: NORMAL
EOSINOPHIL # BLD AUTO: 0.1 K/UL (ref 0–0.5)
EOSINOPHIL NFR BLD: 1.1 % (ref 0–8)
ERYTHROCYTE [DISTWIDTH] IN BLOOD BY AUTOMATED COUNT: 12.5 % (ref 11.5–14.5)
HCT VFR BLD AUTO: 44.6 % (ref 40–54)
HGB BLD-MCNC: 14.6 G/DL (ref 14–18)
IMM GRANULOCYTES # BLD AUTO: 0.01 K/UL (ref 0–0.04)
IMM GRANULOCYTES NFR BLD AUTO: 0.1 % (ref 0–0.5)
LYMPHOCYTES # BLD AUTO: 1.9 K/UL (ref 1–4.8)
LYMPHOCYTES NFR BLD: 27.1 % (ref 18–48)
MCH RBC QN AUTO: 30.2 PG (ref 27–31)
MCHC RBC AUTO-ENTMCNC: 32.7 G/DL (ref 32–36)
MCV RBC AUTO: 92 FL (ref 82–98)
MONOCYTES # BLD AUTO: 0.5 K/UL (ref 0.3–1)
MONOCYTES NFR BLD: 7.3 % (ref 4–15)
NEUTROPHILS # BLD AUTO: 4.4 K/UL (ref 1.8–7.7)
NEUTROPHILS NFR BLD: 63.7 % (ref 38–73)
NRBC BLD-RTO: 0 /100 WBC
PLATELET # BLD AUTO: 279 K/UL (ref 150–350)
PMV BLD AUTO: 9.9 FL (ref 9.2–12.9)
RBC # BLD AUTO: 4.84 M/UL (ref 4.6–6.2)
WBC # BLD AUTO: 6.97 K/UL (ref 3.9–12.7)

## 2021-01-28 PROCEDURE — 3077F PR MOST RECENT SYSTOLIC BLOOD PRESSURE >= 140 MM HG: ICD-10-PCS | Mod: CPTII,S$GLB,, | Performed by: NURSE PRACTITIONER

## 2021-01-28 PROCEDURE — 1159F MED LIST DOCD IN RCRD: CPT | Mod: S$GLB,,, | Performed by: NURSE PRACTITIONER

## 2021-01-28 PROCEDURE — 3077F SYST BP >= 140 MM HG: CPT | Mod: CPTII,S$GLB,, | Performed by: NURSE PRACTITIONER

## 2021-01-28 PROCEDURE — 1126F AMNT PAIN NOTED NONE PRSNT: CPT | Mod: S$GLB,,, | Performed by: NURSE PRACTITIONER

## 2021-01-28 PROCEDURE — 3078F PR MOST RECENT DIASTOLIC BLOOD PRESSURE < 80 MM HG: ICD-10-PCS | Mod: CPTII,S$GLB,, | Performed by: NURSE PRACTITIONER

## 2021-01-28 PROCEDURE — 3288F PR FALLS RISK ASSESSMENT DOCUMENTED: ICD-10-PCS | Mod: CPTII,S$GLB,, | Performed by: NURSE PRACTITIONER

## 2021-01-28 PROCEDURE — 99214 OFFICE O/P EST MOD 30 MIN: CPT | Mod: S$GLB,,, | Performed by: NURSE PRACTITIONER

## 2021-01-28 PROCEDURE — 99999 PR PBB SHADOW E&M-EST. PATIENT-LVL IV: CPT | Mod: PBBFAC,,, | Performed by: NURSE PRACTITIONER

## 2021-01-28 PROCEDURE — 1101F PT FALLS ASSESS-DOCD LE1/YR: CPT | Mod: CPTII,S$GLB,, | Performed by: NURSE PRACTITIONER

## 2021-01-28 PROCEDURE — 36415 COLL VENOUS BLD VENIPUNCTURE: CPT

## 2021-01-28 PROCEDURE — 1126F PR PAIN SEVERITY QUANTIFIED, NO PAIN PRESENT: ICD-10-PCS | Mod: S$GLB,,, | Performed by: NURSE PRACTITIONER

## 2021-01-28 PROCEDURE — 99999 PR PBB SHADOW E&M-EST. PATIENT-LVL IV: ICD-10-PCS | Mod: PBBFAC,,, | Performed by: NURSE PRACTITIONER

## 2021-01-28 PROCEDURE — 3288F FALL RISK ASSESSMENT DOCD: CPT | Mod: CPTII,S$GLB,, | Performed by: NURSE PRACTITIONER

## 2021-01-28 PROCEDURE — 99214 PR OFFICE/OUTPT VISIT, EST, LEVL IV, 30-39 MIN: ICD-10-PCS | Mod: S$GLB,,, | Performed by: NURSE PRACTITIONER

## 2021-01-28 PROCEDURE — 85025 COMPLETE CBC W/AUTO DIFF WBC: CPT

## 2021-01-28 PROCEDURE — 1159F PR MEDICATION LIST DOCUMENTED IN MEDICAL RECORD: ICD-10-PCS | Mod: S$GLB,,, | Performed by: NURSE PRACTITIONER

## 2021-01-28 PROCEDURE — 1101F PR PT FALLS ASSESS DOC 0-1 FALLS W/OUT INJ PAST YR: ICD-10-PCS | Mod: CPTII,S$GLB,, | Performed by: NURSE PRACTITIONER

## 2021-01-28 PROCEDURE — 3078F DIAST BP <80 MM HG: CPT | Mod: CPTII,S$GLB,, | Performed by: NURSE PRACTITIONER

## 2021-07-19 ENCOUNTER — PATIENT OUTREACH (OUTPATIENT)
Dept: ADMINISTRATIVE | Facility: HOSPITAL | Age: 81
End: 2021-07-19

## 2021-07-19 ENCOUNTER — TELEPHONE (OUTPATIENT)
Dept: PRIMARY CARE CLINIC | Facility: CLINIC | Age: 81
End: 2021-07-19

## 2021-08-25 DIAGNOSIS — Z11.59 NEED FOR HEPATITIS C SCREENING TEST: ICD-10-CM

## 2023-03-21 ENCOUNTER — PATIENT OUTREACH (OUTPATIENT)
Dept: ADMINISTRATIVE | Facility: HOSPITAL | Age: 83
End: 2023-03-21
Payer: MEDICARE

## 2023-03-21 NOTE — PROGRESS NOTES
Gigi Un Attributed Report: tried calling patient. No answer. Left voicemail. Patient has never seen [facility] PCP. Has seen Dr. Nam and Dr. Sanchez. Not seen by either since 2020. Been seeing outside pcp since 2021.

## 2023-07-14 ENCOUNTER — PES CALL (OUTPATIENT)
Dept: ADMINISTRATIVE | Facility: CLINIC | Age: 83
End: 2023-07-14
Payer: MEDICARE

## 2023-07-21 ENCOUNTER — PATIENT OUTREACH (OUTPATIENT)
Dept: ADMINISTRATIVE | Facility: HOSPITAL | Age: 83
End: 2023-07-21
Payer: MEDICARE

## 2023-07-21 NOTE — PROGRESS NOTES
[employer]ITATED ANALYSIS: per chart review pt is a resident at [facility]. Flag was already updated, Teams up to date.